# Patient Record
Sex: MALE | Race: BLACK OR AFRICAN AMERICAN | NOT HISPANIC OR LATINO | Employment: STUDENT | URBAN - METROPOLITAN AREA
[De-identification: names, ages, dates, MRNs, and addresses within clinical notes are randomized per-mention and may not be internally consistent; named-entity substitution may affect disease eponyms.]

---

## 2017-03-20 ENCOUNTER — OFFICE VISIT (OUTPATIENT)
Dept: OPTOMETRY | Facility: CLINIC | Age: 22
End: 2017-03-20
Payer: COMMERCIAL

## 2017-03-20 DIAGNOSIS — H52.03 HYPEROPIA, BILATERAL: ICD-10-CM

## 2017-03-20 DIAGNOSIS — H53.9 VISION DISTURBANCE: Primary | ICD-10-CM

## 2017-03-20 PROCEDURE — 92014 COMPRE OPH EXAM EST PT 1/>: CPT | Mod: S$GLB,,, | Performed by: OPTOMETRIST

## 2017-03-20 PROCEDURE — 99999 PR PBB SHADOW E&M-EST. PATIENT-LVL II: CPT | Mod: PBBFAC,,, | Performed by: OPTOMETRIST

## 2017-03-20 PROCEDURE — 92015 DETERMINE REFRACTIVE STATE: CPT | Mod: S$GLB,,, | Performed by: OPTOMETRIST

## 2017-03-20 NOTE — PROGRESS NOTES
HPI     Last eye exam was 4/18/16 with Dr. Muir.  Patient lost glasses and needs an updated rx. Has noticed vision is worse   since not wearing glasses.  Patient denies diplopia, headaches, flashes/floaters, and pain.         Last edited by Sirena Baldwin on 3/20/2017  1:13 PM.     ROS     Positive for: Eyes    Negative for: Constitutional, Gastrointestinal, Neurological, Skin,   Genitourinary, Musculoskeletal, HENT, Endocrine, Cardiovascular,   Respiratory, Psychiatric, Allergic/Imm, Heme/Lymph    Last edited by Marisa Muir, OD on 3/20/2017  3:55 PM. (History)        Assessment /Plan     For exam results, see Encounter Report.    Vision disturbance    Hyperopia, bilateral            1-2.  Glasses rx given.  Eye health normal.   RTC 2 years for routine exam.

## 2017-03-21 ENCOUNTER — HOSPITAL ENCOUNTER (OUTPATIENT)
Dept: RADIOLOGY | Facility: HOSPITAL | Age: 22
Discharge: HOME | End: 2017-03-21
Attending: NURSE PRACTITIONER
Payer: COMMERCIAL

## 2017-03-21 ENCOUNTER — OFFICE VISIT (OUTPATIENT)
Dept: INTERNAL MEDICINE | Facility: CLINIC | Age: 22
End: 2017-03-21
Payer: COMMERCIAL

## 2017-03-21 VITALS
SYSTOLIC BLOOD PRESSURE: 101 MMHG | HEART RATE: 50 BPM | DIASTOLIC BLOOD PRESSURE: 60 MMHG | HEIGHT: 60 IN | TEMPERATURE: 98 F | WEIGHT: 119.69 LBS | OXYGEN SATURATION: 95 % | BODY MASS INDEX: 23.5 KG/M2

## 2017-03-21 DIAGNOSIS — R06.2 WHEEZING: ICD-10-CM

## 2017-03-21 DIAGNOSIS — R05.9 COUGH: ICD-10-CM

## 2017-03-21 DIAGNOSIS — K29.70 GASTRITIS, PRESENCE OF BLEEDING UNSPECIFIED, UNSPECIFIED CHRONICITY, UNSPECIFIED GASTRITIS TYPE: ICD-10-CM

## 2017-03-21 DIAGNOSIS — J18.9 CAP (COMMUNITY ACQUIRED PNEUMONIA): Primary | ICD-10-CM

## 2017-03-21 DIAGNOSIS — E23.0 PANHYPOPITUITARISM: ICD-10-CM

## 2017-03-21 PROCEDURE — 96372 THER/PROPH/DIAG INJ SC/IM: CPT | Mod: S$GLB,,, | Performed by: NURSE PRACTITIONER

## 2017-03-21 PROCEDURE — 71020 XR CHEST PA AND LATERAL: CPT | Mod: TC

## 2017-03-21 PROCEDURE — 94640 AIRWAY INHALATION TREATMENT: CPT | Mod: 51,S$GLB,, | Performed by: NURSE PRACTITIONER

## 2017-03-21 PROCEDURE — 71020 XR CHEST PA AND LATERAL: CPT | Mod: 26,,, | Performed by: RADIOLOGY

## 2017-03-21 PROCEDURE — 99999 PR PBB SHADOW E&M-EST. PATIENT-LVL V: CPT | Mod: PBBFAC,,, | Performed by: NURSE PRACTITIONER

## 2017-03-21 PROCEDURE — 99213 OFFICE O/P EST LOW 20 MIN: CPT | Mod: 25,S$GLB,, | Performed by: NURSE PRACTITIONER

## 2017-03-21 RX ORDER — ONDANSETRON 4 MG/1
4 TABLET, ORALLY DISINTEGRATING ORAL EVERY 8 HOURS PRN
Qty: 30 TABLET | Refills: 0 | Status: SHIPPED | OUTPATIENT
Start: 2017-03-21 | End: 2018-07-02

## 2017-03-21 RX ORDER — ONDANSETRON 4 MG/1
4 TABLET, FILM COATED ORAL
Status: COMPLETED | OUTPATIENT
Start: 2017-03-21 | End: 2017-03-21

## 2017-03-21 RX ORDER — ALBUTEROL SULFATE 0.83 MG/ML
2.5 SOLUTION RESPIRATORY (INHALATION)
Status: COMPLETED | OUTPATIENT
Start: 2017-03-21 | End: 2017-03-21

## 2017-03-21 RX ORDER — BROMPHENIRAMINE MALEATE, PSEUDOEPHEDRINE HYDROCHLORIDE, AND DEXTROMETHORPHAN HYDROBROMIDE 2; 30; 10 MG/5ML; MG/5ML; MG/5ML
5 SYRUP ORAL EVERY 6 HOURS PRN
Refills: 0 | COMMUNITY
Start: 2017-03-16 | End: 2017-11-22

## 2017-03-21 RX ORDER — CEFTRIAXONE 500 MG/1
500 INJECTION, POWDER, FOR SOLUTION INTRAMUSCULAR; INTRAVENOUS
Status: COMPLETED | OUTPATIENT
Start: 2017-03-21 | End: 2017-03-21

## 2017-03-21 RX ORDER — LEVOFLOXACIN 750 MG/1
750 TABLET ORAL DAILY
Qty: 10 TABLET | Refills: 0 | Status: SHIPPED | OUTPATIENT
Start: 2017-03-21 | End: 2018-07-02

## 2017-03-21 RX ORDER — AMOXICILLIN 500 MG/1
500 CAPSULE ORAL 3 TIMES DAILY
Refills: 0 | COMMUNITY
Start: 2017-03-16 | End: 2017-03-21

## 2017-03-21 RX ADMIN — ONDANSETRON 4 MG: 4 TABLET, FILM COATED ORAL at 04:03

## 2017-03-21 RX ADMIN — CEFTRIAXONE 500 MG: 500 INJECTION, POWDER, FOR SOLUTION INTRAMUSCULAR; INTRAVENOUS at 05:03

## 2017-03-21 RX ADMIN — ALBUTEROL SULFATE 2.5 MG: 0.83 SOLUTION RESPIRATORY (INHALATION) at 03:03

## 2017-03-21 NOTE — MR AVS SNAPSHOT
[facility]  [address]  [facility] [address]  Phone: [phone]  Fax: [phone]                  Milton Berkowitz   3/21/2017 3:30 PM   Office Visit    Description:  Male : 1995   Provider:  Nayla Stoddard NP   Department:  [facility]           Reason for Visit     Nausea           Diagnoses this Visit        Comments    CAP (community acquired pneumonia)    -  Primary     Gastritis, presence of bleeding unspecified, unspecified chronicity, unspecified gastritis type         Wheezing         Panhypopituitarism                To Do List           Future Appointments        Provider Department Dept Phone    2017 8:00 AM MD [facility] - Endo/Diab/Metab [phone]      Goals (5 Years of Data)     None       These Medications        Disp Refills Start End    ondansetron (ZOFRAN-ODT) 4 MG TbDL 30 tablet 0 3/21/2017     Take 1 tablet (4 mg total) by mouth every 8 (eight) hours as needed. - Oral    [id number] [address] Ph #: [phone]       levoFLOXacin (LEVAQUIN) 750 MG tablet 10 tablet 0 3/21/2017     Take 1 tablet (750 mg total) by mouth once daily. - Oral    Pharmacy: [id number] - [place] - [address] Ph #: [phone]         [facility]     [facility] On Call Nurse Care Line -  Assistance  Registered nurses in the [facility] provide clinical advisement, health education, appointment booking, and other advisory services.  Call for this free service at [phone].             Medications           Message regarding Medications     Verify the changes and/or additions to your medication regime listed below are the same as discussed with your clinician today.  If any of these changes or additions are incorrect, please notify your healthcare provider.        START taking these NEW medications        Refills    ondansetron (ZOFRAN-ODT)  4 MG TbDL 0    Sig: Take 1 tablet (4 mg total) by mouth every 8 (eight) hours as needed.    Class: Normal    Route: Oral    levoFLOXacin (LEVAQUIN) 750 MG tablet 0    Sig: Take 1 tablet (750 mg total) by mouth once daily.    Class: Normal    Route: Oral      These medications were administered today        Dose Freq    (pyxis) gi cocktail (mylanta 30 mL, lidocaine 2 % viscous 10 mL, dicyclomine 10 mL) 50 mL  Clinic/HOD 1 time    Sig: Take by mouth one time.    Class: Normal    Route: Oral    albuterol nebulizer solution 2.5 mg 2.5 mg Clinic/HOD 1 time    Sig: Take 3 mLs (2.5 mg total) by nebulization one time.    Class: Normal    Route: Nebulization    ondansetron tablet 4 mg 4 mg Clinic/HOD 1 time    Sig: Take 1 tablet (4 mg total) by mouth one time.    Class: Normal    Route: Oral    cefTRIAXone injection 500 mg 500 mg Clinic/HOD 1 time    Sig: Inject 0.5 g (500 mg total) into the muscle one time.    Class: Normal    Route: Intramuscular      STOP taking these medications     amoxicillin (AMOXIL) 500 MG capsule Take 500 mg by mouth 3 (three) times daily.           Verify that the below list of medications is an accurate representation of the medications you are currently taking.  If none reported, the list may be blank. If incorrect, please contact your healthcare provider. Carry this list with you in case of emergency.           Current Medications     albuterol 90 mcg/actuation inhaler Inhale 1-2 puffs into the lungs every 4 to 6 hours as needed for Wheezing or Shortness of Breath.    hydrocortisone (CORTEF) 5 MG Tab Take 5 mg by mouth 3 (three) times daily.    LEVOTHYROXINE SODIUM (SYNTHROID ORAL) Take by mouth.    brompheniramine-pseudoeph-DM 2-30-10 mg/5 mL Syrp Take 5 mLs by mouth every 6 (six) hours as needed.    inhalation device (AEROCHAMBER PLUS FLOW-VU) Use as directed for inhalation.    levoFLOXacin (LEVAQUIN) 750 MG tablet Take 1 tablet (750 mg total) by mouth once daily.    ondansetron (ZOFRAN-ODT) 4  "MG TbDL Take 1 tablet (4 mg total) by mouth every 8 (eight) hours as needed.           Clinical Reference Information           Your Vitals Were     BP Pulse Temp Height Weight SpO2    101/60 50 97.7 °F (36.5 °C) (Oral) 4' 11" (1.499 m) 54.3 kg (119 lb 11.4 oz) 95%    BMI                24.18 kg/m2          Blood Pressure          Most Recent Value    BP  101/60      Allergies as of 3/21/2017     No Known Allergies      Immunizations Administered on Date of Encounter - 3/21/2017     None      Orders Placed During Today's Visit      Normal Orders This Visit    Ambulatory Referral to Endocrinology     Future Labs/Procedures Expected by Expires    Amylase  3/21/2017 5/20/2018    CBC auto differential  3/21/2017 5/20/2018    Comprehensive metabolic panel  3/21/2017 3/21/2018    Lipase  3/21/2017 5/20/2018    TSH  3/21/2017 5/20/2018    X-Ray Chest PA And Lateral  3/21/2017 3/21/2018      Administrations This Visit     (pyxis) gi cocktail (mylanta 30 mL, lidocaine 2 % viscous 10 mL, dicyclomine 10 mL) 50 mL     Admin Date Action Dose Route Administered By             03/21/2017 Given   Oral Judy Blanc LPN                    albuterol nebulizer solution 2.5 mg     Admin Date Action Dose Route Administered By             03/21/2017 Given 2.5 mg Nebulization Judy Blanc LPN                    ondansetron tablet 4 mg     Admin Date Action Dose Route Administered By             03/21/2017 Given 4 mg Oral Judy Blanc LPN                      Language Assistance Services     ATTENTION: Language assistance services are available, free of charge. Please call [phone].      ATENCIÓN: Si habla español, tiene a sim disposición servicios gratuitos de asistencia lingüística. Llame al [phone].     CHÚ Ý: N?u b?n nói Ti?ng Vi?t, có các d?ch v? h? tr? ngôn ng? mi?n phí dành cho b?n. G?i s? [phone].         [facility] complies with applicable Federal civil rights laws and does not discriminate on the " basis of race, color, national origin, age, disability, or sex.

## 2017-03-21 NOTE — PROGRESS NOTES
Subjective:       Patient ID: Milton Berkowitz is a 22 y.o. male.    Chief Complaint: Nausea  Mr Berkowitz presents to the clinic today for ongoing nausea and stomach pain, as well as wheezing/worsening asthma symptoms.     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  He was seen at the school clinic, for suspected flu, about 1 week ago.  He is now home on Spring break.  His flu test was negative.  He was prescribed amoxicillin and cough medicine.  He has not taken the amoxil because when he took it, it made his stomach discomfort and nausea worse.    His medical hx includes panhypopituitarism, but he has been lost to follow up.  He has been treated at [facility] in the past, but his mother's insurance no longer covers them to go to [facility].    Nausea   This is a new problem. The current episode started 1 to 4 weeks ago. The problem occurs constantly. The problem has been gradually worsening. Associated symptoms include abdominal pain, anorexia, a change in bowel habit, congestion, coughing, fatigue, a fever and nausea. Pertinent negatives include no arthralgias, chest pain, chills, diaphoresis, headaches, joint swelling, myalgias, neck pain, numbness, rash, sore throat, swollen glands, urinary symptoms, vertigo, visual change, vomiting or weakness. The symptoms are aggravated by eating (taking amoxil). He has tried nothing for the symptoms.     Review of Systems   Constitutional: Positive for fatigue and fever. Negative for chills and diaphoresis.   HENT: Positive for congestion. Negative for sore throat.    Eyes: Negative for visual disturbance.   Respiratory: Positive for cough.    Cardiovascular: Negative for chest pain.   Gastrointestinal: Positive for abdominal pain, anorexia, change in bowel habit and nausea. Negative for vomiting.   Genitourinary: Negative for dysuria.   Musculoskeletal: Negative for arthralgias, joint swelling, myalgias and neck pain.   Skin: Negative for rash.    Neurological: Negative for vertigo, weakness, numbness and headaches.   Psychiatric/Behavioral: Negative for confusion.       Objective:      Physical Exam   Constitutional: He is oriented to person, place, and time. No distress.   Appears younger than stated age.    HENT:   Head: Normocephalic and atraumatic.   Left Ear: A middle ear effusion is present.   Nose: Mucosal edema and rhinorrhea present. No sinus tenderness. Right sinus exhibits no maxillary sinus tenderness and no frontal sinus tenderness. Left sinus exhibits no maxillary sinus tenderness and no frontal sinus tenderness.   Mouth/Throat: Posterior oropharyngeal erythema present. No oropharyngeal exudate, posterior oropharyngeal edema or tonsillar abscesses.   Eyes: No scleral icterus.   Neck: Normal range of motion. Neck supple.   Cardiovascular: Normal rate, regular rhythm and normal heart sounds.    Pulmonary/Chest: Effort normal. No respiratory distress. He has wheezes. He has no rales. He exhibits no tenderness.   Musculoskeletal: He exhibits no edema.   Lymphadenopathy:     He has no cervical adenopathy.   Neurological: He is alert and oriented to person, place, and time.   Skin: Skin is warm and dry. He is not diaphoretic.   Psychiatric: He has a normal mood and affect. His behavior is normal.   Nursing note and vitals reviewed.      Assessment:       1. CAP (community acquired pneumonia)    2. Gastritis, presence of bleeding unspecified, unspecified chronicity, unspecified gastritis type    3. Wheezing    4. Panhypopituitarism        Plan:   Milton was seen today for nausea.    Diagnoses and all orders for this visit:    CAP (community acquired pneumonia)  -     X-Ray Chest PA And Lateral; Future  -     cefTRIAXone injection 500 mg; Inject 0.5 g (500 mg total) into the muscle one time.  -     levoFLOXacin (LEVAQUIN) 750 MG tablet; Take 1 tablet (750 mg total) by mouth once daily.    Gastritis, presence of bleeding unspecified, unspecified  chronicity, unspecified gastritis type  -     (pyxis) gi cocktail (mylanta 30 mL, lidocaine 2 % viscous 10 mL, dicyclomine 10 mL) 50 mL; Take by mouth one time.  -     CBC auto differential; Future  -     Comprehensive metabolic panel; Future  -     Amylase; Future  -     Lipase; Future  -     ondansetron tablet 4 mg; Take 1 tablet (4 mg total) by mouth one time.    Wheezing  -     albuterol nebulizer solution 2.5 mg; Take 3 mLs (2.5 mg total) by nebulization one time.  -     X-Ray Chest PA And Lateral; Future    Panhypopituitarism  -     Ambulatory Referral to Endocrinology  -     TSH; Future    Other orders  -     ondansetron (ZOFRAN-ODT) 4 MG TbDL; Take 1 tablet (4 mg total) by mouth every 8 (eight) hours as needed.          Pt has been given instructions populated from [employer] database and has verbalized understanding of the after visit summary and information contained wherein.    Follow up with a primary care provider. May go to ER for acute shortness of breath, lightheadedness, fever, or any other emergent complaints or changes in condition.

## 2017-03-21 NOTE — PROGRESS NOTES
2 patient identifiers used (name & ).  Procedure tolerated well.  Instructed to remain for 15-20 minutes and report any adverse reaction

## 2017-07-13 ENCOUNTER — OFFICE VISIT (OUTPATIENT)
Dept: ENDOCRINOLOGY | Facility: CLINIC | Age: 22
End: 2017-07-13
Payer: COMMERCIAL

## 2017-07-13 VITALS
WEIGHT: 133.81 LBS | RESPIRATION RATE: 18 BRPM | DIASTOLIC BLOOD PRESSURE: 76 MMHG | HEIGHT: 60 IN | BODY MASS INDEX: 26.27 KG/M2 | HEART RATE: 98 BPM | SYSTOLIC BLOOD PRESSURE: 98 MMHG

## 2017-07-13 DIAGNOSIS — E34.30 SHORT STATURE DUE TO ENDOCRINE DISORDER: ICD-10-CM

## 2017-07-13 DIAGNOSIS — E23.0 PANHYPOPITUITARISM: Primary | ICD-10-CM

## 2017-07-13 DIAGNOSIS — E23.0 GHD (GROWTH HORMONE DEFICIENCY): ICD-10-CM

## 2017-07-13 PROCEDURE — 99204 OFFICE O/P NEW MOD 45 MIN: CPT | Mod: S$GLB,,, | Performed by: INTERNAL MEDICINE

## 2017-07-13 PROCEDURE — 99999 PR PBB SHADOW E&M-EST. PATIENT-LVL IV: CPT | Mod: PBBFAC,,, | Performed by: INTERNAL MEDICINE

## 2017-07-13 NOTE — PROGRESS NOTES
"Subjective:      Patient ID: Milton Berkowitz is a 22 y.o. male.    Chief Complaint:  Panhypopituitarism      History of Present Illness    Mr.Tyler TORREY Berkowitz is here with his mother as a new patient     He was diagnosed with panhypopituitarism at age 4 per mother   patient was following with [facility] since then   He is here to establish care       He was treated with HC and LT4 as well in past with GH without any improvement in height     Mom : 5'5"   Dad 5"6"    Has not taken GH in a while     As far they known he did not have MRI     Levothyroxine ? Dose 200-220 mcg     Was on HC 2.5 mg three times a day     Has not taken any pit hormones in last 2 years     Working out whole summer but not able to lose weight       He was having polyuria / nocturia was on DDAVP oral but not anymore   No more polyuria/poldypsia         Review of Systems   Constitutional: Positive for unexpected weight change.   Eyes: Negative for visual disturbance.   Respiratory: Negative for shortness of breath.    Cardiovascular: Negative for palpitations and leg swelling.   Gastrointestinal: Negative for constipation and diarrhea.   Endocrine: Negative for polydipsia and polyuria.   Musculoskeletal: Negative for gait problem.   Neurological: Negative for headaches.   Psychiatric/Behavioral: Positive for sleep disturbance (for past couple of weeks ).       Objective:   Physical Exam   Constitutional: He appears well-developed.   HENT:   Right Ear: External ear normal.   Left Ear: External ear normal.   Nose: Nose normal.   Neck: No tracheal deviation present. No thyromegaly present.   Cardiovascular: Normal rate.    No murmur heard.  Pulmonary/Chest: Effort normal and breath sounds normal.       Abdominal: Soft. He exhibits no mass.   Genitourinary:         Genitourinary Comments: Examination performed with      Musculoskeletal: He exhibits no edema.   Neurological: He is alert. No cranial nerve deficit or sensory deficit. " Coordination and gait normal.        Skin: No rash noted.   Psychiatric: He has a normal mood and affect. Judgment normal.   Vitals reviewed.      Lab Review:       Assessment:     1. Panhypopituitarism  ACTH    Cortisol, 8AM    Luteinizing hormone    Follicle stimulating hormone    Prolactin    Insulin-like growth factor    Growth hormone    TSH    T4, free    Testosterone Panel    Comprehensive metabolic panel    MRI Brain W WO Contrast    X-Ray Bone Age Study    CANCELED: X-Ray Wrist 2 View Left   2. GHD (growth hormone deficiency)     3. 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

## 2017-07-13 NOTE — LETTER
July 16, 2017      Nayla Stoddard, NP  [address]  [facility] [address]           [facility]Di[facility]Metab  [address]  [facility] [address]  Phone: [phone]  Fax: [phone]          Patient: Milton Berkowitz   MR Number: [id number]   YOB: 1995   Date of Visit: 7/13/2017       Dear Nayla Stoddard:    Thank you for referring Milton Berkowitz to me for evaluation. Attached you will find relevant portions of my assessment and plan of care.    If you have questions, please do not hesitate to call me. I look forward to following Milton Berkowitz along with you.    Sincerely,    Israel Kerr MD    Enclosure  CC:  No Recipients    If you would like to receive this communication electronically, please contact [email or web address] or [phone] to request more information on [employer] Link access.    For providers and/or their staff who would like to refer a patient to [facility], please contact us through our one-stop-shop provider referral line, [facility], at [phone].    If you feel you have received this communication in error or would no longer like to receive these types of communications, please e-mail [email or web address]

## 2017-07-14 ENCOUNTER — LAB VISIT (OUTPATIENT)
Dept: LAB | Facility: OTHER | Age: 22
End: 2017-07-14
Attending: INTERNAL MEDICINE
Payer: COMMERCIAL

## 2017-07-14 DIAGNOSIS — E23.0 PANHYPOPITUITARISM: ICD-10-CM

## 2017-07-14 LAB
ALBUMIN SERPL BCP-MCNC: 3.9 G/DL
ALP SERPL-CCNC: 125 U/L
ALT SERPL W/O P-5'-P-CCNC: 19 U/L
ANION GAP SERPL CALC-SCNC: 9 MMOL/L
AST SERPL-CCNC: 27 U/L
BILIRUB SERPL-MCNC: 0.4 MG/DL
BUN SERPL-MCNC: 13 MG/DL
CALCIUM SERPL-MCNC: 9.4 MG/DL
CHLORIDE SERPL-SCNC: 106 MMOL/L
CO2 SERPL-SCNC: 24 MMOL/L
CORTIS SERPL-MCNC: 6.5 UG/DL
CREAT SERPL-MCNC: 0.7 MG/DL
EST. GFR  (AFRICAN AMERICAN): >60 ML/MIN/1.73 M^2
EST. GFR  (NON AFRICAN AMERICAN): >60 ML/MIN/1.73 M^2
FSH SERPL-ACNC: 2.3 MIU/ML
GLUCOSE SERPL-MCNC: 78 MG/DL
LH SERPL-ACNC: 0.8 MIU/ML
POTASSIUM SERPL-SCNC: 4 MMOL/L
PROLACTIN SERPL IA-MCNC: 16.7 NG/ML
PROT SERPL-MCNC: 7.2 G/DL
SODIUM SERPL-SCNC: 139 MMOL/L
T4 FREE SERPL-MCNC: 0.64 NG/DL
TSH SERPL DL<=0.005 MIU/L-ACNC: 5.14 UIU/ML

## 2017-07-14 PROCEDURE — 84146 ASSAY OF PROLACTIN: CPT

## 2017-07-14 PROCEDURE — 83001 ASSAY OF GONADOTROPIN (FSH): CPT

## 2017-07-14 PROCEDURE — 80053 COMPREHEN METABOLIC PANEL: CPT

## 2017-07-14 PROCEDURE — 84439 ASSAY OF FREE THYROXINE: CPT

## 2017-07-14 PROCEDURE — 84443 ASSAY THYROID STIM HORMONE: CPT

## 2017-07-14 PROCEDURE — 83003 ASSAY GROWTH HORMONE (HGH): CPT

## 2017-07-14 PROCEDURE — 84270 ASSAY OF SEX HORMONE GLOBUL: CPT

## 2017-07-14 PROCEDURE — 82533 TOTAL CORTISOL: CPT

## 2017-07-14 PROCEDURE — 82024 ASSAY OF ACTH: CPT

## 2017-07-14 PROCEDURE — 83002 ASSAY OF GONADOTROPIN (LH): CPT

## 2017-07-14 PROCEDURE — 84305 ASSAY OF SOMATOMEDIN: CPT

## 2017-07-16 PROBLEM — E34.30 SHORT STATURE DUE TO ENDOCRINE DISORDER: Status: ACTIVE | Noted: 2017-07-16

## 2017-07-17 LAB
IGF-I SERPL-MCNC: 17 NG/ML (ref 91–442)
IGF-I Z-SCORE SERPL: <-3 SD

## 2017-07-18 ENCOUNTER — PATIENT MESSAGE (OUTPATIENT)
Dept: ENDOCRINOLOGY | Facility: CLINIC | Age: 22
End: 2017-07-18

## 2017-07-18 DIAGNOSIS — E23.0 PANHYPOPITUITARISM: ICD-10-CM

## 2017-07-18 DIAGNOSIS — E34.30 SHORT STATURE DUE TO ENDOCRINE DISORDER: ICD-10-CM

## 2017-07-18 DIAGNOSIS — E23.0 GHD (GROWTH HORMONE DEFICIENCY): Primary | ICD-10-CM

## 2017-07-18 LAB
ACTH PLAS-MCNC: 26 PG/ML
GH SERPL-MCNC: <0.1 NG/ML

## 2017-07-19 LAB
ALBUMIN SERPL-MCNC: 4.4 G/DL (ref 3.6–5.1)
SHBG SERPL-SCNC: 49 NMOL/L (ref 10–50)
TESTOST FREE SERPL-MCNC: 0.8 PG/ML (ref 46–224)
TESTOST SERPL-MCNC: 10 NG/DL (ref 250–1100)
TESTOSTERONE.FREE+WB SERPL-MCNC: 1.7 NG/DL (ref 110–575)

## 2017-07-24 ENCOUNTER — HOSPITAL ENCOUNTER (OUTPATIENT)
Dept: RADIOLOGY | Facility: HOSPITAL | Age: 22
Discharge: HOME | End: 2017-07-24
Attending: INTERNAL MEDICINE
Payer: COMMERCIAL

## 2017-07-24 DIAGNOSIS — E23.0 PANHYPOPITUITARISM: ICD-10-CM

## 2017-07-24 PROCEDURE — 25500020 PHARM REV CODE 255: Performed by: INTERNAL MEDICINE

## 2017-07-24 PROCEDURE — 77072 BONE AGE STUDIES: CPT | Mod: 26,,, | Performed by: RADIOLOGY

## 2017-07-24 PROCEDURE — A9585 GADOBUTROL INJECTION: HCPCS | Performed by: INTERNAL MEDICINE

## 2017-07-24 PROCEDURE — 70553 MRI BRAIN STEM W/O & W/DYE: CPT | Mod: TC

## 2017-07-24 PROCEDURE — 70553 MRI BRAIN STEM W/O & W/DYE: CPT | Mod: 26,,, | Performed by: RADIOLOGY

## 2017-07-24 PROCEDURE — 77072 BONE AGE STUDIES: CPT | Mod: TC

## 2017-07-24 RX ORDER — GADOBUTROL 604.72 MG/ML
3 INJECTION INTRAVENOUS
Status: COMPLETED | OUTPATIENT
Start: 2017-07-24 | End: 2017-07-24

## 2017-07-24 RX ADMIN — GADOBUTROL 3 ML: 604.72 INJECTION INTRAVENOUS at 05:07

## 2017-07-28 NOTE — TELEPHONE ENCOUNTER
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    # Sec. Hypothyroidism   Start hydrocortisone first after 2 days start levothyroxine 75 mcg daily     # GH deficiency   Start GH therapy 0.2 mg/kg/week is 1.71 mg per day

## 2017-07-30 ENCOUNTER — PATIENT MESSAGE (OUTPATIENT)
Dept: ENDOCRINOLOGY | Facility: CLINIC | Age: 22
End: 2017-07-30

## 2017-07-31 ENCOUNTER — TELEPHONE (OUTPATIENT)
Dept: ENDOCRINOLOGY | Facility: CLINIC | Age: 22
End: 2017-07-31

## 2017-07-31 ENCOUNTER — TELEPHONE (OUTPATIENT)
Dept: PHARMACY | Facility: CLINIC | Age: 22
End: 2017-07-31

## 2017-07-31 DIAGNOSIS — E23.0 GHD (GROWTH HORMONE DEFICIENCY): Primary | ICD-10-CM

## 2017-07-31 RX ORDER — HYDROCORTISONE 5 MG/1
TABLET ORAL
Qty: 100 TABLET | Refills: 11 | Status: SHIPPED | OUTPATIENT
Start: 2017-07-31 | End: 2018-03-23 | Stop reason: SDUPTHER

## 2017-07-31 RX ORDER — LEVOTHYROXINE SODIUM 75 UG/1
75 TABLET ORAL DAILY
Qty: 30 TABLET | Refills: 11 | Status: SHIPPED | OUTPATIENT
Start: 2017-07-31 | End: 2018-03-23 | Stop reason: SDUPTHER

## 2017-07-31 NOTE — TELEPHONE ENCOUNTER
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     # Sec. Hypothyroidism   Start hydrocortisone first after 2 days start levothyroxine 75 mcg daily      # GH deficiency   start GH with 1.5 mg daily

## 2017-07-31 NOTE — TELEPHONE ENCOUNTER
LVM- Hello this is [facility] specialty pharmacy, we have received an order for Nutropin from your provider and will contact you once a benefits investigation is complete with copay information, to set up pickup or shipment, and [facility] consultation.  feel free to give us a call with  any questions prior to hearing back from us at [phone]. thank you!_ABAD 7/31/17

## 2017-08-09 NOTE — TELEPHONE ENCOUNTER
PA for Nutropin AQ Nuspin DENIED because the formulary alternatives are Omnitrope and Norditropin.    Routing provider to advise.

## 2017-08-10 NOTE — TELEPHONE ENCOUNTER
Agree   We will check IGF-1 in 1 month after starting norditropin. So please let us know.     Concepción once  let us know. Book IGF-1 at 1 month from starting date

## 2017-08-11 ENCOUNTER — PATIENT MESSAGE (OUTPATIENT)
Dept: ENDOCRINOLOGY | Facility: CLINIC | Age: 22
End: 2017-08-11

## 2017-08-14 NOTE — TELEPHONE ENCOUNTER
FOR DOCUMENTATION ONLY:  Norditropin prior authorization approved x 1 year  8/10/17 through 8/9/18  PA# [id number]  $250.00 co pay.   Forwarded to financial assistance team for co pay card...CEB

## 2017-08-16 NOTE — TELEPHONE ENCOUNTER
DOCUMENTATION ONLY:     [employer]ordisure Copay Assistance Copay Card:    Norditropin Copay: $0.00   BIN:[id number]   ID:[id number]   GRP: [id number]   PCN: [id number]  Effective dates: 08/01/2017 to 08/01/2018   card will save patient up to $250/monthly fill

## 2017-08-17 NOTE — TELEPHONE ENCOUNTER
Called patient, he asked we contact his mother, Adrianna. Confirmed 2 patient identifiers - name and .    Norditropin Flexpro consultation and injection training offered to mom, Adrianna. She states that patient was on genotropin in the past and she is familiar with the drug; consultation declined. She will look at  website and call should any further questions arise. Patient asked to have medication shipped on 17 to arrive at patient's home on 17. $0 copay (004). Address confirmed - NO signature requirement requested. Pt currently is going to school in [place] nad mom aware that [facility] could not ship there currently. She asked this shipment be sent to her home and she will either drive it to him or have him come home to get it. She understands to call [facility] if it needs to be transferred to be filled at [facility] in the future, where it can be shipped to pt in [place].  All questions answered and addressed to patients satisfaction. [facility] will continue to reach out to patient monthly to arrange refills.

## 2017-08-21 ENCOUNTER — PATIENT MESSAGE (OUTPATIENT)
Dept: ENDOCRINOLOGY | Facility: CLINIC | Age: 22
End: 2017-08-21

## 2017-08-28 ENCOUNTER — PATIENT MESSAGE (OUTPATIENT)
Dept: ENDOCRINOLOGY | Facility: CLINIC | Age: 22
End: 2017-08-28

## 2017-09-03 ENCOUNTER — PATIENT MESSAGE (OUTPATIENT)
Dept: ENDOCRINOLOGY | Facility: CLINIC | Age: 22
End: 2017-09-03

## 2017-09-07 ENCOUNTER — PATIENT MESSAGE (OUTPATIENT)
Dept: ENDOCRINOLOGY | Facility: CLINIC | Age: 22
End: 2017-09-07

## 2017-09-08 ENCOUNTER — TELEPHONE (OUTPATIENT)
Dept: PHARMACY | Facility: CLINIC | Age: 22
End: 2017-09-08

## 2017-09-08 NOTE — TELEPHONE ENCOUNTER
Patient mother  called and after using two patient identifiers we scheduled a refill of the patients  Norditriopin to be shipped on 09/11/2017. Patient denies any new allergies , conditions or medications and does not have any questions for the pharmacist at this time. Patient has 14 number of doses on hand and has not missed any doses. Patients address was verified and a credit card is on file.

## 2017-09-20 ENCOUNTER — PATIENT MESSAGE (OUTPATIENT)
Dept: ENDOCRINOLOGY | Facility: CLINIC | Age: 22
End: 2017-09-20

## 2017-09-22 ENCOUNTER — PATIENT MESSAGE (OUTPATIENT)
Dept: ENDOCRINOLOGY | Facility: CLINIC | Age: 22
End: 2017-09-22

## 2017-10-02 ENCOUNTER — TELEPHONE (OUTPATIENT)
Dept: PHARMACY | Facility: CLINIC | Age: 22
End: 2017-10-02

## 2017-10-27 ENCOUNTER — PATIENT MESSAGE (OUTPATIENT)
Dept: ENDOCRINOLOGY | Facility: CLINIC | Age: 22
End: 2017-10-27

## 2017-10-27 DIAGNOSIS — E23.0 GHD (GROWTH HORMONE DEFICIENCY): Primary | ICD-10-CM

## 2017-10-28 ENCOUNTER — PATIENT MESSAGE (OUTPATIENT)
Dept: ENDOCRINOLOGY | Facility: CLINIC | Age: 22
End: 2017-10-28

## 2017-10-28 ENCOUNTER — LAB VISIT (OUTPATIENT)
Dept: LAB | Facility: HOSPITAL | Age: 22
End: 2017-10-28
Attending: INTERNAL MEDICINE
Payer: COMMERCIAL

## 2017-10-28 DIAGNOSIS — E23.0 GHD (GROWTH HORMONE DEFICIENCY): ICD-10-CM

## 2017-10-28 PROCEDURE — 84305 ASSAY OF SOMATOMEDIN: CPT

## 2017-10-28 PROCEDURE — 36415 COLL VENOUS BLD VENIPUNCTURE: CPT

## 2017-10-29 ENCOUNTER — PATIENT MESSAGE (OUTPATIENT)
Dept: ENDOCRINOLOGY | Facility: CLINIC | Age: 22
End: 2017-10-29

## 2017-10-31 ENCOUNTER — TELEPHONE (OUTPATIENT)
Dept: PHARMACY | Facility: CLINIC | Age: 22
End: 2017-10-31

## 2017-10-31 RX ORDER — SOMATROPIN 10 MG/1.5ML
INJECTION, SOLUTION SUBCUTANEOUS
Qty: 7.5 ML | Refills: 2 | Status: SHIPPED | OUTPATIENT
Start: 2017-10-31 | End: 2018-01-25 | Stop reason: SDUPTHER

## 2017-11-01 LAB
IGF-I SERPL-MCNC: 137 NG/ML (ref 91–442)
IGF-I Z-SCORE SERPL: -1.28 SD

## 2017-11-02 ENCOUNTER — PATIENT MESSAGE (OUTPATIENT)
Dept: ENDOCRINOLOGY | Facility: CLINIC | Age: 22
End: 2017-11-02

## 2017-11-02 DIAGNOSIS — E23.0 PANHYPOPITUITARISM: Primary | ICD-10-CM

## 2017-11-04 ENCOUNTER — PATIENT MESSAGE (OUTPATIENT)
Dept: ENDOCRINOLOGY | Facility: CLINIC | Age: 22
End: 2017-11-04

## 2017-11-22 ENCOUNTER — OFFICE VISIT (OUTPATIENT)
Dept: ENDOCRINOLOGY | Facility: CLINIC | Age: 22
End: 2017-11-22
Payer: COMMERCIAL

## 2017-11-22 ENCOUNTER — PATIENT MESSAGE (OUTPATIENT)
Dept: ENDOCRINOLOGY | Facility: CLINIC | Age: 22
End: 2017-11-22

## 2017-11-22 VITALS
WEIGHT: 130.5 LBS | RESPIRATION RATE: 16 BRPM | HEIGHT: 60 IN | BODY MASS INDEX: 25.62 KG/M2 | DIASTOLIC BLOOD PRESSURE: 68 MMHG | HEART RATE: 70 BPM | SYSTOLIC BLOOD PRESSURE: 102 MMHG

## 2017-11-22 DIAGNOSIS — E03.9 HYPOTHYROIDISM, UNSPECIFIED TYPE: ICD-10-CM

## 2017-11-22 DIAGNOSIS — E23.0 GHD (GROWTH HORMONE DEFICIENCY): ICD-10-CM

## 2017-11-22 DIAGNOSIS — E23.0 PANHYPOPITUITARISM: Primary | ICD-10-CM

## 2017-11-22 DIAGNOSIS — E34.30 SHORT STATURE DUE TO ENDOCRINE DISORDER: ICD-10-CM

## 2017-11-22 DIAGNOSIS — E23.0 HGHG (HYPOGONADOTROPIC HYPOGONADISM): ICD-10-CM

## 2017-11-22 PROCEDURE — 99999 PR PBB SHADOW E&M-EST. PATIENT-LVL III: CPT | Mod: PBBFAC,,, | Performed by: INTERNAL MEDICINE

## 2017-11-22 PROCEDURE — 99214 OFFICE O/P EST MOD 30 MIN: CPT | Mod: S$GLB,,, | Performed by: INTERNAL MEDICINE

## 2017-11-22 RX ORDER — TESTOSTERONE CYPIONATE 200 MG/ML
50 INJECTION, SOLUTION INTRAMUSCULAR
Qty: 9 ML | Refills: 0 | Status: SHIPPED | OUTPATIENT
Start: 2017-11-22 | End: 2018-03-23 | Stop reason: SDUPTHER

## 2017-11-22 NOTE — PROGRESS NOTES
"Subjective:      Patient ID: Milton Berkowitz is a 22 y.o. male.    Chief Complaint:  No chief complaint on file.      History of Present Illness    Mr.Tyler TORREY Berkowitz is here with his mother as a new patient     He was diagnosed with panhypopituitarism at age 4 per mother   patient was following with [facility] since then   He is here to establish care       He was treated with HC and LT4 as well in past with GH without any improvement in height     Mom : 5'5"   Dad 5"6"    Has not taken GH in a while before saw me     Has not taken any pit hormones in  2 years prior to visit with me     Working out whole summer but not able to lose weight       He was having polyuria / nocturia was on DDAVP oral but not anymore   No more polyuria/poldypsia     Interval HPI:  Doing well with norditropin 1.5 mg daily   Levothyroxine 75 mcg daily   HC 10 and 5 mg           Review of Systems   Constitutional: Positive for unexpected weight change.   Eyes: Negative for visual disturbance.   Respiratory: Negative for shortness of breath.    Cardiovascular: Negative for palpitations and leg swelling.   Gastrointestinal: Negative for constipation and diarrhea.   Endocrine: Negative for polydipsia and polyuria.   Musculoskeletal: Negative for gait problem.   Neurological: Negative for headaches.   Psychiatric/Behavioral: Negative for sleep disturbance.       Objective:   Physical Exam   Vitals reviewed.      Lab Review:       Assessment:     1. Panhypopituitarism  T4, free    Insulin-like growth factor    Testosterone    Follicle stimulating hormone    Luteinizing hormone    PSA, Screening    CBC auto differential    Comprehensive metabolic panel    T4, free    PSA, Screening    CBC auto differential    Comprehensive metabolic panel   2. GHD (growth hormone deficiency)  T4, free    Insulin-like growth factor    Testosterone    Follicle stimulating hormone    Luteinizing hormone    PSA, Screening    CBC auto differential    Comprehensive " metabolic panel    T4, free    PSA, Screening    CBC auto differential    Comprehensive metabolic panel   3. Short stature due to endocrine disorder  T4, free    Insulin-like growth factor    Testosterone    Follicle stimulating hormone    Luteinizing hormone    PSA, Screening    CBC auto differential    Comprehensive metabolic panel    T4, free    PSA, Screening    CBC auto differential    Comprehensive metabolic panel   4. Hypothyroidism, unspecified type  T4, free    Insulin-like growth factor    Testosterone    Follicle stimulating hormone    Luteinizing hormone    PSA, Screening    CBC auto differential    Comprehensive metabolic panel    T4, free    PSA, Screening    CBC auto differential    Comprehensive metabolic panel   5. 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     Panhypopituitarism-   Secondary adrenal insufficiency-   Continue  Hydrocortisone   Reviewed times of increased dose - sick day precautions handout provided and reviewed  Needs medical alert tag  Follow symptoms and labs      Secondary hypothyroidism - clinically and biochemically euthyroid  Follow ft4 and adjust accordingly       Hypogonadotrophic hypogonadism  Start testosterone 50 mg monthly   Check PSA, CBC, CMP today and in 1 month  No erections less likely making sperms, can not provide semen sample for analysis        Secondary AGHD - reviewed risks and benefits of therapy   Continue norditropin 1.5 mg daily   Follow igf-1 levels   At goal         Sodium normal no s/s of DI             Plan:

## 2017-12-01 ENCOUNTER — TELEPHONE (OUTPATIENT)
Dept: PHARMACY | Facility: CLINIC | Age: 22
End: 2017-12-01

## 2017-12-08 ENCOUNTER — PATIENT MESSAGE (OUTPATIENT)
Dept: ENDOCRINOLOGY | Facility: CLINIC | Age: 22
End: 2017-12-08

## 2017-12-23 ENCOUNTER — PATIENT MESSAGE (OUTPATIENT)
Dept: ENDOCRINOLOGY | Facility: CLINIC | Age: 22
End: 2017-12-23

## 2017-12-28 ENCOUNTER — TELEPHONE (OUTPATIENT)
Dept: PHARMACY | Facility: CLINIC | Age: 22
End: 2017-12-28

## 2017-12-28 ENCOUNTER — PATIENT MESSAGE (OUTPATIENT)
Dept: ENDOCRINOLOGY | Facility: CLINIC | Age: 22
End: 2017-12-28

## 2018-01-26 RX ORDER — SOMATROPIN 10 MG/1.5ML
INJECTION, SOLUTION SUBCUTANEOUS
Qty: 7.5 ML | Refills: 2 | Status: SHIPPED | OUTPATIENT
Start: 2018-01-26 | End: 2018-04-25 | Stop reason: SDUPTHER

## 2018-01-29 ENCOUNTER — TELEPHONE (OUTPATIENT)
Dept: PHARMACY | Facility: CLINIC | Age: 23
End: 2018-01-29

## 2018-02-05 ENCOUNTER — PATIENT MESSAGE (OUTPATIENT)
Dept: ENDOCRINOLOGY | Facility: CLINIC | Age: 23
End: 2018-02-05

## 2018-02-23 ENCOUNTER — PATIENT MESSAGE (OUTPATIENT)
Dept: ENDOCRINOLOGY | Facility: CLINIC | Age: 23
End: 2018-02-23

## 2018-02-23 ENCOUNTER — TELEPHONE (OUTPATIENT)
Dept: PHARMACY | Facility: CLINIC | Age: 23
End: 2018-02-23

## 2018-03-15 ENCOUNTER — PATIENT MESSAGE (OUTPATIENT)
Dept: ENDOCRINOLOGY | Facility: CLINIC | Age: 23
End: 2018-03-15

## 2018-03-23 ENCOUNTER — OFFICE VISIT (OUTPATIENT)
Dept: ENDOCRINOLOGY | Facility: CLINIC | Age: 23
End: 2018-03-23
Payer: COMMERCIAL

## 2018-03-23 VITALS
HEIGHT: 60 IN | SYSTOLIC BLOOD PRESSURE: 100 MMHG | WEIGHT: 131.38 LBS | HEART RATE: 70 BPM | BODY MASS INDEX: 25.79 KG/M2 | DIASTOLIC BLOOD PRESSURE: 82 MMHG

## 2018-03-23 DIAGNOSIS — E03.8 CENTRAL HYPOTHYROIDISM: ICD-10-CM

## 2018-03-23 DIAGNOSIS — E55.9 VITAMIN D DEFICIENCY: ICD-10-CM

## 2018-03-23 DIAGNOSIS — E23.0 PANHYPOPITUITARISM: Primary | ICD-10-CM

## 2018-03-23 DIAGNOSIS — E23.0 HGHG (HYPOGONADOTROPIC HYPOGONADISM): ICD-10-CM

## 2018-03-23 DIAGNOSIS — E23.0 GHD (GROWTH HORMONE DEFICIENCY): ICD-10-CM

## 2018-03-23 PROCEDURE — 99999 PR PBB SHADOW E&M-EST. PATIENT-LVL III: CPT | Mod: PBBFAC,,, | Performed by: INTERNAL MEDICINE

## 2018-03-23 PROCEDURE — 99214 OFFICE O/P EST MOD 30 MIN: CPT | Mod: S$GLB,,, | Performed by: INTERNAL MEDICINE

## 2018-03-23 RX ORDER — LEVOTHYROXINE SODIUM 75 UG/1
75 TABLET ORAL DAILY
Qty: 30 TABLET | Refills: 11 | Status: SHIPPED | OUTPATIENT
Start: 2018-03-23 | End: 2019-06-26

## 2018-03-23 RX ORDER — HYDROCORTISONE 5 MG/1
TABLET ORAL
Qty: 100 TABLET | Refills: 11 | Status: SHIPPED | OUTPATIENT
Start: 2018-03-23 | End: 2019-11-27 | Stop reason: SDUPTHER

## 2018-03-23 RX ORDER — TESTOSTERONE CYPIONATE 200 MG/ML
50 INJECTION, SOLUTION INTRAMUSCULAR
Qty: 9 ML | Refills: 0 | Status: SHIPPED | OUTPATIENT
Start: 2018-03-23 | End: 2018-07-02 | Stop reason: SDUPTHER

## 2018-03-23 NOTE — PROGRESS NOTES
"Subjective:      Patient ID: Miltno Berkowitz is a 23 y.o. male.    Chief Complaint:  Panhypopituitarism       History of Present Illness    Mr.Tyler TORREY Berkowitz returns to clinic today for follow up   Patient of Dr. Kerr   This is the patient's initial visit with me today     He was diagnosed with panhypopituitarism at age 4 per mother   patient was following with [facility] since then   Established care with [facility] in 11/2017       He was treated with HC and LT4 as well in past with GH without any improvement in height     Mom : 5'5"   Dad:  5"6"    Current regimen:   Hydrocortisone 5mg tablets - takes 10 mg every morning and 5 mg every evening   Levothyroxine 75 mcg   Norditropin 1.5 mg daily      Patient reports taking all medications as prescribed     He was having polyuria / nocturia was on DDAVP oral but not anymore   denies polyuria/poldypsia at this time     Patient was also prescribed Testosterone but states he is not taking due to insurance issues   States health insurance will not cover prescription     Review of Systems   Constitutional: Negative for unexpected weight change.   Eyes: Negative for visual disturbance.   Respiratory: Negative for shortness of breath.    Cardiovascular: Negative for palpitations and leg swelling.   Gastrointestinal: Negative for constipation and diarrhea.   Endocrine: Negative for polydipsia and polyuria.   Musculoskeletal: Negative for gait problem.   Neurological: Negative for headaches.   Psychiatric/Behavioral: Negative for sleep disturbance.     Objective:   Physical Exam   Constitutional: He is oriented to person, place, and time. No distress.   Neck: No thyromegaly present.   Cardiovascular: Normal rate and regular rhythm.    Pulmonary/Chest: Effort normal.   Abdominal: Soft.   Musculoskeletal: He exhibits no edema.   Neurological: He is alert and oriented to person, place, and time. He displays normal reflexes.   Skin: Skin is warm and dry.   Psychiatric: He has a " normal mood and affect. His behavior is normal.   Nursing note and vitals reviewed.    Lab Review:   Results for GEREMIAS GAMBOA (MRN [id number]) as of 3/23/2018 12:12   Ref. Range 7/14/2017 08:00 11/22/2017 12:15   TSH Latest Ref Range: 0.400 - 4.000 uIU/mL 5.139 (H)    Free T4 Latest Ref Range: 0.71 - 1.51 ng/dL 0.64 (L) 0.97     Results for GEREMIAS GAMBOA (MRN [id number]) as of 3/23/2018 12:12   Ref. Range 7/14/2017 08:00   ACTH Latest Ref Range: 0 - 46 pg/mL 26   Growth Hormone Latest Ref Range: 0.00 - 3.00 ng/mL <0.1     Results for GEREMIAS GAMBOA (MRN [id number]) as of 3/23/2018 12:12   Ref. Range 7/14/2017 08:00   Cortisol -8 AM Latest Ref Range: 4.30 - 22.40 ug/dL 6.5     Results for GEREMIAS GAMBOA (MRN [id number]) as of 3/23/2018 12:12   Ref. Range 4/18/2016 10:55   Vit D, 25-Hydroxy Latest Ref Range: 30 - 96 ng/mL 10 (L)     Results for GEREMIAS GAMBOA (MRN [id number]) as of 3/23/2018 12:12   Ref. Range 11/22/2017 12:15   Sodium Latest Ref Range: 136 - 145 mmol/L 139   Potassium Latest Ref Range: 3.5 - 5.1 mmol/L 4.6   Chloride Latest Ref Range: 95 - 110 mmol/L 105   CO2 Latest Ref Range: 23 - 29 mmol/L 26   Anion Gap Latest Ref Range: 8 - 16 mmol/L 8   BUN, Bld Latest Ref Range: 6 - 20 mg/dL 14   Creatinine Latest Ref Range: 0.5 - 1.4 mg/dL 0.7   eGFR if non African American Latest Ref Range: >60 mL/min/1.73 m^2 >60.0   eGFR if African American Latest Ref Range: >60 mL/min/1.73 m^2 >60.0   Glucose Latest Ref Range: 70 - 110 mg/dL 83   Calcium Latest Ref Range: 8.7 - 10.5 mg/dL 9.8   Alkaline Phosphatase Latest Ref Range: 55 - 135 U/L 249 (H)   Total Protein Latest Ref Range: 6.0 - 8.4 g/dL 7.6   Albumin Latest Ref Range: 3.5 - 5.2 g/dL 3.9   Total Bilirubin Latest Ref Range: 0.1 - 1.0 mg/dL 0.7   AST Latest Ref Range: 10 - 40 U/L 25   ALT Latest Ref Range: 10 - 44 U/L 12     Results for GEREMIAS GAMBOA (MRN [id number]) as of 3/23/2018 12:12   Ref. Range 7/14/2017 08:00   FSH Latest Ref Range: 0.95 - 11.95 mIU/mL 2.30   LH  Latest Ref Range: 0.6 - 12.1 mIU/mL 0.8   Prolactin Latest Ref Range: 3.5 - 19.4 ng/mL 16.7   Testosterone Latest Ref Range: 250 - 1100 ng/dL 10 (L)   Testosterone, Free Latest Ref Range: 46.0 - 224.0 pg/mL 0.8 (L)   Testosterone Testosterone Latest Ref Range: 110.0 - 575.0 ng/dL 1.7 (L)   Sex Hormone Binding Globulin Latest Ref Range: 10 - 50 nmol/L 49   Albumin Latest Ref Range: 3.6 - 5.1 g/dL 4.4     Results for GEREMIAS GAMBOA (MRN [id number]) as of 3/23/2018 12:12   Ref. Range 4/18/2016 10:55   DHEA-SO4 Latest Ref Range: 45.1 - 385.0 ug/dL 105.9     Results for GEREMIAS GAMBOA (MRN [id number]) as of 3/23/2018 14:47   Ref. Range 10/28/2017 11:21   Somatomedin (IGF-I) Latest Ref Range: 91 - 442 ng/mL 137     Results for GEREMIAS GAMBOA (MRN [id number]) as of 3/23/2018 14:47   Ref. Range 11/22/2017 12:15   PSA, SCREEN Latest Ref Range: 0.00 - 4.00 ng/mL <0.01     Results for GEREMIAS GAMBOA (MRN [id number]) as of 3/23/2018 14:47   Ref. Range 11/22/2017 12:15   WBC Latest Ref Range: 3.90 - 12.70 K/uL 6.51   RBC Latest Ref Range: 4.60 - 6.20 M/uL 3.97 (L)   Hemoglobin Latest Ref Range: 14.0 - 18.0 g/dL 11.5 (L)   Hematocrit Latest Ref Range: 40.0 - 54.0 % 34.4 (L)   MCV Latest Ref Range: 82 - 98 fL 87   MCH Latest Ref Range: 27.0 - 31.0 pg 29.0   MCHC Latest Ref Range: 32.0 - 36.0 g/dL 33.4   RDW Latest Ref Range: 11.5 - 14.5 % 12.5   Platelets Latest Ref Range: 150 - 350 K/uL 298   MPV Latest Ref Range: 9.2 - 12.9 fL 10.4   Gran% Latest Ref Range: 38.0 - 73.0 % 63.4   Gran # (ANC) Latest Ref Range: 1.8 - 7.7 K/uL 4.1   Immature Granulocytes Latest Ref Range: 0.0 - 0.5 % 0.5   Immature Grans (Abs) Latest Ref Range: 0.00 - 0.04 K/uL 0.03   Lymph% Latest Ref Range: 18.0 - 48.0 % 28.0   Lymph # Latest Ref Range: 1.0 - 4.8 K/uL 1.8   Mono% Latest Ref Range: 4.0 - 15.0 % 6.1   Mono # Latest Ref Range: 0.3 - 1.0 K/uL 0.4   Eosinophil% Latest Ref Range: 0.0 - 8.0 % 1.5   Eos # Latest Ref Range: 0.0 - 0.5 K/uL 0.1   Basophil% Latest Ref  Range: 0.0 - 1.9 % 0.5   Baso # Latest Ref Range: 0.00 - 0.20 K/uL 0.03     Assessment:     1. Panhypopituitarism  hydrocortisone (CORTEF) 5 MG Tab    Testosterone    Comprehensive metabolic panel    Insulin-like growth factor   2. Central hypothyroidism  levothyroxine (SYNTHROID) 75 MCG tablet    T4, free   3. Vitamin D deficiency  Vitamin D   4. HGHG (hypogonadotropic hypogonadism)  testosterone cypionate (DEPOTESTOTERONE CYPIONATE) 200 mg/mL injection   5. GHD (growth hormone deficiency)          ASSESSMENT AND PLAN:     Very interesting case Mr.Tyler TORREY Berkowitz is a college  Student     1. Panhypopituitarism-   Secondary adrenal insufficiency-   Continue  Hydrocortisone   Reviewed times of increased dose - sick day precautions handout provided and reviewed  Needs medical alert tag  Follow symptoms and labs      2. Secondary hypothyroidism - clinically and biochemically euthyroid  Follow ft4 and adjust accordingly  Continue Levothyroxine 75 mcg once daily     3. Vitamin d deficiency   Noted to be low in 2016  Check vitamin d level today and will treat accordingly     4. Hypogonadotrophic hypogonadism  Start testosterone 50 mg monthly   Check CBC in 1 month   Will arrange BMD at next visit - pt attends college out of state and is leaving on Sunday to return to school   No erections less likely making sperms, can not provide semen sample for analysis     5. Secondary AGHD - reviewed risks and benefits of therapy   Continue norditropin 1.5 mg daily   Follow igf-1 levels   At goal         Sodium normal no s/s of DI       Patient personally interviewed Dr. Kerr   Recommendations were discussed with the patient in detail   Advised to notify office if he has any additional concerns or questions regarding his care   The patient voiced understanding

## 2018-03-24 ENCOUNTER — LAB VISIT (OUTPATIENT)
Dept: LAB | Facility: HOSPITAL | Age: 23
End: 2018-03-24
Attending: INTERNAL MEDICINE
Payer: COMMERCIAL

## 2018-03-24 DIAGNOSIS — E55.9 VITAMIN D DEFICIENCY: ICD-10-CM

## 2018-03-24 DIAGNOSIS — E03.8 CENTRAL HYPOTHYROIDISM: ICD-10-CM

## 2018-03-24 DIAGNOSIS — E23.0 PANHYPOPITUITARISM: ICD-10-CM

## 2018-03-24 LAB
25(OH)D3+25(OH)D2 SERPL-MCNC: 5 NG/ML
ALBUMIN SERPL BCP-MCNC: 4.3 G/DL
ALP SERPL-CCNC: 172 U/L
ALT SERPL W/O P-5'-P-CCNC: 12 U/L
ANION GAP SERPL CALC-SCNC: 11 MMOL/L
AST SERPL-CCNC: 22 U/L
BILIRUB SERPL-MCNC: 0.7 MG/DL
BUN SERPL-MCNC: 10 MG/DL
CALCIUM SERPL-MCNC: 10.2 MG/DL
CHLORIDE SERPL-SCNC: 106 MMOL/L
CO2 SERPL-SCNC: 25 MMOL/L
CREAT SERPL-MCNC: 0.7 MG/DL
EST. GFR  (AFRICAN AMERICAN): >60 ML/MIN/1.73 M^2
EST. GFR  (NON AFRICAN AMERICAN): >60 ML/MIN/1.73 M^2
GLUCOSE SERPL-MCNC: 98 MG/DL
POTASSIUM SERPL-SCNC: 4.4 MMOL/L
PROT SERPL-MCNC: 7.5 G/DL
SODIUM SERPL-SCNC: 142 MMOL/L
T4 FREE SERPL-MCNC: 0.81 NG/DL
TESTOST SERPL-MCNC: 10 NG/DL

## 2018-03-24 PROCEDURE — 84439 ASSAY OF FREE THYROXINE: CPT

## 2018-03-24 PROCEDURE — 84403 ASSAY OF TOTAL TESTOSTERONE: CPT

## 2018-03-24 PROCEDURE — 82306 VITAMIN D 25 HYDROXY: CPT

## 2018-03-24 PROCEDURE — 84305 ASSAY OF SOMATOMEDIN: CPT

## 2018-03-24 PROCEDURE — 80053 COMPREHEN METABOLIC PANEL: CPT

## 2018-03-26 ENCOUNTER — TELEPHONE (OUTPATIENT)
Dept: ENDOCRINOLOGY | Facility: CLINIC | Age: 23
End: 2018-03-26

## 2018-03-26 ENCOUNTER — TELEPHONE (OUTPATIENT)
Dept: PHARMACY | Facility: CLINIC | Age: 23
End: 2018-03-26

## 2018-03-26 DIAGNOSIS — E55.9 VITAMIN D DEFICIENCY DISEASE: ICD-10-CM

## 2018-03-26 RX ORDER — ERGOCALCIFEROL 1.25 MG/1
CAPSULE ORAL
Qty: 12 CAPSULE | Refills: 2 | Status: SHIPPED | OUTPATIENT
Start: 2018-03-26 | End: 2018-07-07 | Stop reason: SDUPTHER

## 2018-03-29 LAB
IGF-I SERPL-MCNC: 112 NG/ML (ref 91–442)
IGF-I Z-SCORE SERPL: -1.65 SD

## 2018-04-23 RX ORDER — SOMATROPIN 10 MG/1.5ML
INJECTION, SOLUTION SUBCUTANEOUS
Qty: 7.5 ML | Refills: 2 | Status: CANCELLED | OUTPATIENT
Start: 2018-04-23

## 2018-04-26 ENCOUNTER — TELEPHONE (OUTPATIENT)
Dept: PHARMACY | Facility: CLINIC | Age: 23
End: 2018-04-26

## 2018-05-21 ENCOUNTER — TELEPHONE (OUTPATIENT)
Dept: PHARMACY | Facility: CLINIC | Age: 23
End: 2018-05-21

## 2018-06-07 ENCOUNTER — PATIENT MESSAGE (OUTPATIENT)
Dept: ENDOCRINOLOGY | Facility: CLINIC | Age: 23
End: 2018-06-07

## 2018-06-08 ENCOUNTER — PATIENT MESSAGE (OUTPATIENT)
Dept: ENDOCRINOLOGY | Facility: CLINIC | Age: 23
End: 2018-06-08

## 2018-06-22 ENCOUNTER — TELEPHONE (OUTPATIENT)
Dept: PHARMACY | Facility: CLINIC | Age: 23
End: 2018-06-22

## 2018-07-02 ENCOUNTER — OFFICE VISIT (OUTPATIENT)
Dept: ENDOCRINOLOGY | Facility: CLINIC | Age: 23
End: 2018-07-02
Payer: COMMERCIAL

## 2018-07-02 VITALS
HEIGHT: 60 IN | WEIGHT: 140.19 LBS | HEART RATE: 77 BPM | DIASTOLIC BLOOD PRESSURE: 70 MMHG | BODY MASS INDEX: 27.52 KG/M2 | SYSTOLIC BLOOD PRESSURE: 110 MMHG

## 2018-07-02 DIAGNOSIS — E23.0 PANHYPOPITUITARISM: Primary | ICD-10-CM

## 2018-07-02 DIAGNOSIS — E03.8 CENTRAL HYPOTHYROIDISM: ICD-10-CM

## 2018-07-02 DIAGNOSIS — E55.9 VITAMIN D DEFICIENCY DISEASE: ICD-10-CM

## 2018-07-02 DIAGNOSIS — E23.0 GHD (GROWTH HORMONE DEFICIENCY): ICD-10-CM

## 2018-07-02 DIAGNOSIS — E23.0 HGHG (HYPOGONADOTROPIC HYPOGONADISM): ICD-10-CM

## 2018-07-02 PROCEDURE — 99999 PR PBB SHADOW E&M-EST. PATIENT-LVL III: CPT | Mod: PBBFAC,,, | Performed by: INTERNAL MEDICINE

## 2018-07-02 PROCEDURE — 3008F BODY MASS INDEX DOCD: CPT | Mod: CPTII,S$GLB,, | Performed by: INTERNAL MEDICINE

## 2018-07-02 PROCEDURE — 99214 OFFICE O/P EST MOD 30 MIN: CPT | Mod: S$GLB,,, | Performed by: INTERNAL MEDICINE

## 2018-07-02 RX ORDER — TESTOSTERONE CYPIONATE 200 MG/ML
50 INJECTION, SOLUTION INTRAMUSCULAR
Qty: 10 ML | Refills: 0 | Status: SHIPPED | OUTPATIENT
Start: 2018-07-02 | End: 2018-07-05 | Stop reason: SDUPTHER

## 2018-07-02 NOTE — PROGRESS NOTES
"Subjective:      Patient ID: Milton Berkowitz is a 23 y.o. male.    Chief Complaint:  Panhypopituitarism       History of Present Illness    Mr.Tyler TORREY Berkowitz returns to clinic today for follow up   Patient of Dr. Kerr   This is the patient's second visit with me today     He was diagnosed with panhypopituitarism at age 4 per mother   patient was following with [facility] since then   Established care with [facility] in 11/2017       He was treated with HC and LT4 as well in past with GH without any improvement in height     Mom : 5'5"   Dad:  5"6"    Current regimen:   Hydrocortisone 5mg tablets - takes 10 mg every morning and 5 mg every evening   Levothyroxine 75 mcg   Norditropin 1.5 mg daily   Testosterone 50 mg IM every 28 days      Patient reports taking all medications as prescribed     He was having polyuria / nocturia was on DDAVP oral but not anymore   Denies polyuria/poldypsia at this time     He denies recent falls or fractures     Review of Systems   Constitutional: Negative for unexpected weight change.   Eyes: Negative for visual disturbance.   Respiratory: Negative for shortness of breath.    Cardiovascular: Negative for palpitations and leg swelling.   Gastrointestinal: Negative for constipation and diarrhea.   Endocrine: Negative for polydipsia and polyuria.   Musculoskeletal: Negative for gait problem.   Neurological: Negative for headaches.   Psychiatric/Behavioral: Negative for sleep disturbance.     Objective:   Physical Exam   Constitutional: He is oriented to person, place, and time. No distress.   Neck: No thyromegaly present.   Cardiovascular: Normal rate and regular rhythm.    Pulmonary/Chest: Effort normal.   Abdominal: Soft.   Musculoskeletal: He exhibits no edema.   Neurological: He is alert and oriented to person, place, and time. He displays normal reflexes.   Skin: Skin is warm and dry.   Psychiatric: He has a normal mood and affect. His behavior is normal.   Nursing note and vitals " reviewed.    Lab Review:   Results for GEREMIAS GAMBOA (MRN [id number]) as of 7/2/2018 12:49   Ref. Range 7/14/2017 08:00 11/22/2017 12:15 3/24/2018 08:03   TSH Latest Ref Range: 0.400 - 4.000 uIU/mL 5.139 (H)     Free T4 Latest Ref Range: 0.71 - 1.51 ng/dL 0.64 (L) 0.97 0.81     Results for GEREMIAS GAMBOA (MRN [id number]) as of 3/23/2018 12:12   Ref. Range 7/14/2017 08:00   ACTH Latest Ref Range: 0 - 46 pg/mL 26   Growth Hormone Latest Ref Range: 0.00 - 3.00 ng/mL <0.1     Results for GEREMIAS GAMBOA (MRN [id number]) as of 3/23/2018 12:12   Ref. Range 7/14/2017 08:00   Cortisol -8 AM Latest Ref Range: 4.30 - 22.40 ug/dL 6.5     Results for GEREMIAS GAMBOA (MRN [id number]) as of 7/2/2018 12:49   Ref. Range 3/24/2018 08:03   Vit D, 25-Hydroxy Latest Ref Range: 30 - 96 ng/mL 5 (L)     Results for GEREMIAS GAMBOA (MRN [id number]) as of 7/2/2018 12:49   Ref. Range 3/24/2018 08:03   Sodium Latest Ref Range: 136 - 145 mmol/L 142   Potassium Latest Ref Range: 3.5 - 5.1 mmol/L 4.4   Chloride Latest Ref Range: 95 - 110 mmol/L 106   CO2 Latest Ref Range: 23 - 29 mmol/L 25   Anion Gap Latest Ref Range: 8 - 16 mmol/L 11   BUN, Bld Latest Ref Range: 6 - 20 mg/dL 10   Creatinine Latest Ref Range: 0.5 - 1.4 mg/dL 0.7   eGFR if non African American Latest Ref Range: >60 mL/min/1.73 m^2 >60   eGFR if  Latest Ref Range: >60 mL/min/1.73 m^2 >60   Glucose Latest Ref Range: 70 - 110 mg/dL 98   Calcium Latest Ref Range: 8.7 - 10.5 mg/dL 10.2   Alkaline Phosphatase Latest Ref Range: 55 - 135 U/L 172 (H)   Total Protein Latest Ref Range: 6.0 - 8.4 g/dL 7.5   Albumin Latest Ref Range: 3.5 - 5.2 g/dL 4.3   Total Bilirubin Latest Ref Range: 0.1 - 1.0 mg/dL 0.7   AST Latest Ref Range: 10 - 40 U/L 22   ALT Latest Ref Range: 10 - 44 U/L 12     Results for GEREMIAS GAMBOA (MRN [id number]) as of 7/2/2018 12:49   Ref. Range 3/24/2018 08:03   Testosterone, Total Latest Ref Range: 195.0 - 1138.0 ng/dL 10 (L)     Results for GEREMIAS GAMBOA (MRN [id number]) as  of 3/23/2018 12:12   Ref. Range 7/14/2017 08:00   FSH Latest Ref Range: 0.95 - 11.95 mIU/mL 2.30   LH Latest Ref Range: 0.6 - 12.1 mIU/mL 0.8   Prolactin Latest Ref Range: 3.5 - 19.4 ng/mL 16.7   Testosterone Latest Ref Range: 250 - 1100 ng/dL 10 (L)   Testosterone, Free Latest Ref Range: 46.0 - 224.0 pg/mL 0.8 (L)   Testosterone Testosterone Latest Ref Range: 110.0 - 575.0 ng/dL 1.7 (L)   Sex Hormone Binding Globulin Latest Ref Range: 10 - 50 nmol/L 49   Albumin Latest Ref Range: 3.6 - 5.1 g/dL 4.4     Results for GEREMIAS GAMBOA (MRN [id number]) as of 3/23/2018 12:12   Ref. Range 4/18/2016 10:55   DHEA-SO4 Latest Ref Range: 45.1 - 385.0 ug/dL 105.9     Results for GEREMIAS GAMBOA (MRN [id number]) as of 3/23/2018 14:47   Ref. Range 10/28/2017 11:21   Somatomedin (IGF-I) Latest Ref Range: 91 - 442 ng/mL 137     Results for GEREMIAS GAMBOA (MRN [id number]) as of 3/23/2018 14:47   Ref. Range 11/22/2017 12:15   PSA, SCREEN Latest Ref Range: 0.00 - 4.00 ng/mL <0.01     Results for GEREMIAS GAMBOA (MRN [id number]) as of 3/23/2018 14:47   Ref. Range 11/22/2017 12:15   WBC Latest Ref Range: 3.90 - 12.70 K/uL 6.51   RBC Latest Ref Range: 4.60 - 6.20 M/uL 3.97 (L)   Hemoglobin Latest Ref Range: 14.0 - 18.0 g/dL 11.5 (L)   Hematocrit Latest Ref Range: 40.0 - 54.0 % 34.4 (L)   MCV Latest Ref Range: 82 - 98 fL 87   MCH Latest Ref Range: 27.0 - 31.0 pg 29.0   MCHC Latest Ref Range: 32.0 - 36.0 g/dL 33.4   RDW Latest Ref Range: 11.5 - 14.5 % 12.5   Platelets Latest Ref Range: 150 - 350 K/uL 298   MPV Latest Ref Range: 9.2 - 12.9 fL 10.4   Gran% Latest Ref Range: 38.0 - 73.0 % 63.4   Gran # (ANC) Latest Ref Range: 1.8 - 7.7 K/uL 4.1   Immature Granulocytes Latest Ref Range: 0.0 - 0.5 % 0.5   Immature Grans (Abs) Latest Ref Range: 0.00 - 0.04 K/uL 0.03   Lymph% Latest Ref Range: 18.0 - 48.0 % 28.0   Lymph # Latest Ref Range: 1.0 - 4.8 K/uL 1.8   Mono% Latest Ref Range: 4.0 - 15.0 % 6.1   Mono # Latest Ref Range: 0.3 - 1.0 K/uL 0.4   Eosinophil%  Latest Ref Range: 0.0 - 8.0 % 1.5   Eos # Latest Ref Range: 0.0 - 0.5 K/uL 0.1   Basophil% Latest Ref Range: 0.0 - 1.9 % 0.5   Baso # Latest Ref Range: 0.00 - 0.20 K/uL 0.03     Assessment:     1. Panhypopituitarism  Cortisol, 8AM    ACTH    DXA Bone Density Spine And Hip    Basic metabolic panel   2. Central hypothyroidism  T4, free   3. Vitamin D deficiency disease  Vitamin D   4. HGHG (hypogonadotropic hypogonadism)  CBC auto differential    Testosterone    DXA Bone Density Spine And Hip    testosterone cypionate (DEPOTESTOTERONE CYPIONATE) 200 mg/mL injection   5. GHD (growth hormone deficiency)  Insulin-like growth factor    DXA Bone Density Spine And Hip        ASSESSMENT AND PLAN:     Very interesting case Mr.Tyler TORREY Berkowitz is a college  Student. 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 Panhypopituitarism-   Secondary adrenal insufficiency-   Continue  Hydrocortisone   Reviewed times of increased dose - sick day precautions handout provided and reviewed  Needs medical alert tag- advised to purchase ASAP before returning to school   Follow symptoms and labs      2. Secondary hypothyroidism - clinically and biochemically euthyroid  Follow ft4 and adjust accordingly  Continue Levothyroxine 75 mcg once daily     3. Vitamin d deficiency   Taking Ergo as prescribed   Check labs , will treat accordingly     4. Hypogonadotrophic hypogonadism  Continue testosterone 50 mg monthly   Check CBC with next set of labs   Will arrange BMD prior to leaving for school in 08/2018  No erections less likely making sperms, can not provide semen sample for analysis     5. Secondary AGHD - reviewed risks and benefits of therapy   Continue norditropin 1.5 mg daily   Follow igf-1 levels         Sodium normal no s/s of DI       We will call with results

## 2018-07-03 ENCOUNTER — LAB VISIT (OUTPATIENT)
Dept: LAB | Facility: HOSPITAL | Age: 23
End: 2018-07-03
Payer: COMMERCIAL

## 2018-07-03 DIAGNOSIS — E03.8 CENTRAL HYPOTHYROIDISM: ICD-10-CM

## 2018-07-03 DIAGNOSIS — E23.0 GHD (GROWTH HORMONE DEFICIENCY): ICD-10-CM

## 2018-07-03 DIAGNOSIS — E55.9 VITAMIN D DEFICIENCY DISEASE: ICD-10-CM

## 2018-07-03 DIAGNOSIS — E23.0 PANHYPOPITUITARISM: ICD-10-CM

## 2018-07-03 DIAGNOSIS — E23.0 HGHG (HYPOGONADOTROPIC HYPOGONADISM): ICD-10-CM

## 2018-07-03 LAB
25(OH)D3+25(OH)D2 SERPL-MCNC: 14 NG/ML
ANION GAP SERPL CALC-SCNC: 10 MMOL/L
BASOPHILS # BLD AUTO: 0.02 K/UL
BASOPHILS NFR BLD: 0.4 %
BUN SERPL-MCNC: 7 MG/DL
CALCIUM SERPL-MCNC: 9.6 MG/DL
CHLORIDE SERPL-SCNC: 108 MMOL/L
CO2 SERPL-SCNC: 24 MMOL/L
CORTIS SERPL-MCNC: 17.6 UG/DL
CREAT SERPL-MCNC: 0.7 MG/DL
DIFFERENTIAL METHOD: ABNORMAL
EOSINOPHIL # BLD AUTO: 0.3 K/UL
EOSINOPHIL NFR BLD: 6.4 %
ERYTHROCYTE [DISTWIDTH] IN BLOOD BY AUTOMATED COUNT: 12.2 %
EST. GFR  (AFRICAN AMERICAN): >60 ML/MIN/1.73 M^2
EST. GFR  (NON AFRICAN AMERICAN): >60 ML/MIN/1.73 M^2
GLUCOSE SERPL-MCNC: 119 MG/DL
HCT VFR BLD AUTO: 32.4 %
HGB BLD-MCNC: 10.8 G/DL
IMM GRANULOCYTES # BLD AUTO: 0.01 K/UL
IMM GRANULOCYTES NFR BLD AUTO: 0.2 %
LYMPHOCYTES # BLD AUTO: 2.4 K/UL
LYMPHOCYTES NFR BLD: 45.3 %
MCH RBC QN AUTO: 29.8 PG
MCHC RBC AUTO-ENTMCNC: 33.3 G/DL
MCV RBC AUTO: 89 FL
MONOCYTES # BLD AUTO: 0.4 K/UL
MONOCYTES NFR BLD: 7.8 %
NEUTROPHILS # BLD AUTO: 2.1 K/UL
NEUTROPHILS NFR BLD: 39.9 %
NRBC BLD-RTO: 0 /100 WBC
PLATELET # BLD AUTO: 241 K/UL
PMV BLD AUTO: 10.8 FL
POTASSIUM SERPL-SCNC: 3.4 MMOL/L
RBC # BLD AUTO: 3.63 M/UL
SODIUM SERPL-SCNC: 142 MMOL/L
T4 FREE SERPL-MCNC: 0.74 NG/DL
TESTOST SERPL-MCNC: 12 NG/DL
WBC # BLD AUTO: 5.28 K/UL

## 2018-07-03 PROCEDURE — 82024 ASSAY OF ACTH: CPT

## 2018-07-03 PROCEDURE — 82306 VITAMIN D 25 HYDROXY: CPT

## 2018-07-03 PROCEDURE — 36415 COLL VENOUS BLD VENIPUNCTURE: CPT

## 2018-07-03 PROCEDURE — 84439 ASSAY OF FREE THYROXINE: CPT

## 2018-07-03 PROCEDURE — 84403 ASSAY OF TOTAL TESTOSTERONE: CPT

## 2018-07-03 PROCEDURE — 82533 TOTAL CORTISOL: CPT

## 2018-07-03 PROCEDURE — 85025 COMPLETE CBC W/AUTO DIFF WBC: CPT

## 2018-07-03 PROCEDURE — 84305 ASSAY OF SOMATOMEDIN: CPT

## 2018-07-03 PROCEDURE — 80048 BASIC METABOLIC PNL TOTAL CA: CPT

## 2018-07-05 ENCOUNTER — TELEPHONE (OUTPATIENT)
Dept: ENDOCRINOLOGY | Facility: CLINIC | Age: 23
End: 2018-07-05

## 2018-07-05 DIAGNOSIS — E23.0 HGHG (HYPOGONADOTROPIC HYPOGONADISM): Primary | ICD-10-CM

## 2018-07-05 RX ORDER — TESTOSTERONE CYPIONATE 200 MG/ML
100 INJECTION, SOLUTION INTRAMUSCULAR
Qty: 10 ML | Refills: 0
Start: 2018-07-05 | End: 2018-07-30 | Stop reason: SDUPTHER

## 2018-07-05 NOTE — TELEPHONE ENCOUNTER
Case reviewed with Dr. Romo. Serum testosterone remains below goal. Will increase testosterone to 100 mg every 28 days. Notified patient's mother. Given instructions on medication changes. She voiced understanding. Also informed to continue Ergo 50 K once weekly and start over the counter vitamin D3 at 1,000 IU's once daily as vitamin D level remains below goal. Will check testosterone in 3 weeks

## 2018-07-06 ENCOUNTER — HOSPITAL ENCOUNTER (OUTPATIENT)
Dept: RADIOLOGY | Facility: CLINIC | Age: 23
Discharge: HOME | End: 2018-07-06
Attending: INTERNAL MEDICINE
Payer: COMMERCIAL

## 2018-07-06 DIAGNOSIS — E23.0 HGHG (HYPOGONADOTROPIC HYPOGONADISM): ICD-10-CM

## 2018-07-06 DIAGNOSIS — E23.0 PANHYPOPITUITARISM: ICD-10-CM

## 2018-07-06 DIAGNOSIS — E23.0 GHD (GROWTH HORMONE DEFICIENCY): ICD-10-CM

## 2018-07-06 LAB — ACTH PLAS-MCNC: 34 PG/ML

## 2018-07-06 PROCEDURE — 77080 DXA BONE DENSITY AXIAL: CPT | Mod: 26,,, | Performed by: INTERNAL MEDICINE

## 2018-07-06 PROCEDURE — 77080 DXA BONE DENSITY AXIAL: CPT | Mod: TC

## 2018-07-07 DIAGNOSIS — E55.9 VITAMIN D DEFICIENCY DISEASE: ICD-10-CM

## 2018-07-08 LAB
IGF-I SERPL-MCNC: 170 NG/ML (ref 91–442)
IGF-I Z-SCORE SERPL: -0.84 SD

## 2018-07-08 RX ORDER — ERGOCALCIFEROL 1.25 MG/1
CAPSULE ORAL
Qty: 12 CAPSULE | Refills: 2 | Status: SHIPPED | OUTPATIENT
Start: 2018-07-08 | End: 2020-06-15

## 2018-07-12 ENCOUNTER — PATIENT MESSAGE (OUTPATIENT)
Dept: ENDOCRINOLOGY | Facility: CLINIC | Age: 23
End: 2018-07-12

## 2018-07-12 DIAGNOSIS — E23.0 HGHG (HYPOGONADOTROPIC HYPOGONADISM): ICD-10-CM

## 2018-07-25 ENCOUNTER — TELEPHONE (OUTPATIENT)
Dept: PHARMACY | Facility: CLINIC | Age: 23
End: 2018-07-25

## 2018-07-27 ENCOUNTER — LAB VISIT (OUTPATIENT)
Dept: LAB | Facility: HOSPITAL | Age: 23
End: 2018-07-27
Payer: COMMERCIAL

## 2018-07-27 DIAGNOSIS — E23.0 HGHG (HYPOGONADOTROPIC HYPOGONADISM): ICD-10-CM

## 2018-07-27 LAB — TESTOST SERPL-MCNC: NORMAL NG/DL

## 2018-07-27 PROCEDURE — 36415 COLL VENOUS BLD VENIPUNCTURE: CPT

## 2018-07-27 PROCEDURE — 84403 ASSAY OF TOTAL TESTOSTERONE: CPT

## 2018-07-30 LAB — MAYO MISCELLANEOUS RESULT (REF): NORMAL

## 2018-08-01 RX ORDER — TESTOSTERONE CYPIONATE 200 MG/ML
100 INJECTION, SOLUTION INTRAMUSCULAR
Qty: 10 ML | Refills: 0 | Status: SHIPPED | OUTPATIENT
Start: 2018-08-01 | End: 2020-06-15

## 2018-08-20 NOTE — TELEPHONE ENCOUNTER
FOR DOCUMENTATION ONLY:    Financial Assistance for Norditropin approved from 8/1/18 to 8/1/19     Source [employer] Card     BIN: [id number]   RENUKAN: TIFFANIE  Id: [id number]   GRP: [id number]

## 2018-08-22 ENCOUNTER — TELEPHONE (OUTPATIENT)
Dept: PHARMACY | Facility: CLINIC | Age: 23
End: 2018-08-22

## 2018-09-03 ENCOUNTER — PATIENT MESSAGE (OUTPATIENT)
Dept: ENDOCRINOLOGY | Facility: CLINIC | Age: 23
End: 2018-09-03

## 2018-09-04 ENCOUNTER — PATIENT MESSAGE (OUTPATIENT)
Dept: ENDOCRINOLOGY | Facility: CLINIC | Age: 23
End: 2018-09-04

## 2018-09-18 ENCOUNTER — TELEPHONE (OUTPATIENT)
Dept: PHARMACY | Facility: CLINIC | Age: 23
End: 2018-09-18

## 2018-10-18 ENCOUNTER — TELEPHONE (OUTPATIENT)
Dept: PHARMACY | Facility: CLINIC | Age: 23
End: 2018-10-18

## 2018-10-19 DIAGNOSIS — E23.0 GHD (GROWTH HORMONE DEFICIENCY): Primary | ICD-10-CM

## 2018-11-13 ENCOUNTER — TELEPHONE (OUTPATIENT)
Dept: PHARMACY | Facility: CLINIC | Age: 23
End: 2018-11-13

## 2018-11-15 ENCOUNTER — TELEPHONE (OUTPATIENT)
Dept: PHARMACY | Facility: CLINIC | Age: 23
End: 2018-11-15

## 2018-12-10 ENCOUNTER — TELEPHONE (OUTPATIENT)
Dept: PHARMACY | Facility: CLINIC | Age: 23
End: 2018-12-10

## 2018-12-13 ENCOUNTER — OFFICE VISIT (OUTPATIENT)
Dept: INTERNAL MEDICINE | Facility: CLINIC | Age: 23
End: 2018-12-13
Payer: COMMERCIAL

## 2018-12-13 ENCOUNTER — IMMUNIZATION (OUTPATIENT)
Dept: INTERNAL MEDICINE | Facility: CLINIC | Age: 23
End: 2018-12-13
Payer: COMMERCIAL

## 2018-12-13 VITALS
BODY MASS INDEX: 26.41 KG/M2 | SYSTOLIC BLOOD PRESSURE: 104 MMHG | WEIGHT: 134.5 LBS | DIASTOLIC BLOOD PRESSURE: 72 MMHG | HEART RATE: 66 BPM | HEIGHT: 60 IN

## 2018-12-13 DIAGNOSIS — J45.909 ASTHMA, UNSPECIFIED ASTHMA SEVERITY, UNSPECIFIED WHETHER COMPLICATED, UNSPECIFIED WHETHER PERSISTENT: ICD-10-CM

## 2018-12-13 DIAGNOSIS — L98.9 SKIN LESION: ICD-10-CM

## 2018-12-13 DIAGNOSIS — Z00.00 ENCOUNTER FOR PREVENTIVE HEALTH EXAMINATION: Primary | ICD-10-CM

## 2018-12-13 PROCEDURE — 90686 IIV4 VACC NO PRSV 0.5 ML IM: CPT | Mod: S$GLB,,, | Performed by: INTERNAL MEDICINE

## 2018-12-13 PROCEDURE — 90471 IMMUNIZATION ADMIN: CPT | Mod: S$GLB,,, | Performed by: INTERNAL MEDICINE

## 2018-12-13 PROCEDURE — 99999 PR PBB SHADOW E&M-EST. PATIENT-LVL IV: CPT | Mod: PBBFAC,,, | Performed by: PHYSICIAN ASSISTANT

## 2018-12-13 PROCEDURE — 99395 PREV VISIT EST AGE 18-39: CPT | Mod: S$GLB,,, | Performed by: PHYSICIAN ASSISTANT

## 2018-12-13 RX ORDER — ALBUTEROL SULFATE 90 UG/1
1-2 AEROSOL, METERED RESPIRATORY (INHALATION)
Qty: 1 INHALER | Refills: 3 | Status: SHIPPED | OUTPATIENT
Start: 2018-12-13 | End: 2018-12-13 | Stop reason: SDUPTHER

## 2018-12-13 RX ORDER — ALBUTEROL SULFATE 90 UG/1
1-2 AEROSOL, METERED RESPIRATORY (INHALATION)
Qty: 1 INHALER | Refills: 3 | Status: SHIPPED | OUTPATIENT
Start: 2018-12-13 | End: 2019-06-12 | Stop reason: SDUPTHER

## 2018-12-13 NOTE — PROGRESS NOTES
Subjective:       Patient ID: Milton Berkowitz is a 23 y.o. male.        Chief Complaint: Annual Exam    Milton Berkowitz is an established patient of Zenaida Duffy MD (Inactive) here today for annual exam.    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    Panhypopituitarism: followed by endocrinology, will schedule f/u while home for holiday break.    Left ear lesion: appeared after getting ears re-pierced in 12/2017, the lesion gets larger/smaller, somewhat painful at times, worsens when he wears earrings.             Review of Systems   Constitutional: Negative for appetite change, chills, fatigue and fever.   HENT: Negative for congestion and sore throat.    Eyes: Negative for visual disturbance.   Respiratory: Negative for cough, chest tightness and shortness of breath.    Cardiovascular: Negative for chest pain, palpitations and leg swelling.   Gastrointestinal: Negative for abdominal pain, blood in stool, constipation, diarrhea, nausea and vomiting.   Genitourinary: Negative for dysuria, frequency, hematuria and urgency.   Musculoskeletal: Negative for arthralgias and back pain.   Skin: Negative for rash.        Left ear skin lesion   Neurological: Negative for dizziness, syncope, weakness and headaches.   Psychiatric/Behavioral: Negative for dysphoric mood and sleep disturbance. The patient is not nervous/anxious.        Objective:      Physical Exam   Constitutional: He appears well-developed and well-nourished.   HENT:   Head: Normocephalic.   Right Ear: External ear normal.   Left Ear: External ear normal.   Mouth/Throat: Oropharynx is clear and moist.   Posterior earlobe with 0.75 cm hardened punctate lesion, non tender   Eyes: Pupils are equal, round, and reactive to light.   Cardiovascular: Normal rate, regular rhythm and normal heart sounds. Exam reveals no gallop and no friction rub.   No murmur heard.  Pulmonary/Chest:  "Effort normal and breath sounds normal. No respiratory distress.   Abdominal: Soft. There is no tenderness.   Musculoskeletal: He exhibits no edema.   Neurological: He is alert.   Skin: Skin is warm and dry.   Psychiatric: He has a normal mood and affect.   Nursing note and vitals reviewed.      Assessment:       1. Encounter for preventive health examination    2. Asthma, unspecified asthma severity, unspecified whether complicated, unspecified whether persistent    3. Skin lesion        Plan:       Milton was seen today for annual exam.    Diagnoses and all orders for this visit:    Encounter for preventive health examination  -     Lipid panel; Future    Asthma, unspecified asthma severity, unspecified whether complicated, unspecified whether persistent  -     REFILL: albuterol (PROVENTIL/VENTOLIN HFA) 90 mcg/actuation inhaler; Inhale 1-2 puffs into the lungs every 4 to 6 hours as needed for Wheezing or Shortness of Breath.    Skin lesion of left ear  -     Ambulatory referral to Dermatology    Flu shot today.    He will consider HPV series and perhaps get administered at school clinic.      Schedule f/u with endo while home for break.      Pt has been given instructions populated from [employer] database and has verbalized understanding of the after visit summary and information contained wherein.    Follow up with a primary care provider. May go to ER for acute shortness of breath, lightheadedness, fever, or any other emergent complaints or changes in condition.    "This note will be shared with the patient"    Future Appointments   Date Time Provider Department Center   4/12/2019 11:15 AM [facility]               "

## 2018-12-13 NOTE — PATIENT INSTRUCTIONS
Schedule an appointment with Shala Kunz for your routine follow up with endocrinology    Your tetanus vaccine will be due next year    HPV vaccine series      Prevention Guidelines, Men Ages 18 to 39  Screening tests and vaccines are an important part of managing your health. Health counseling is essential, too. Below are guidelines for these, for men ages 18 to 39. Talk with your healthcare provider to make sure youre up-to-date on what you need.  Screening Who needs it How often   Alcohol misuse All men in this age group At routine exams   Blood pressure All men in this age group Every 2 years if your blood pressure is less than 120/80 mm Hg; yearly if your systolic blood pressure is 120 to 139 mm Hg, or your diastolic blood pressure reading is 80 to 89 mm Hg   Depression All men in this age group At routine exams   Diabetes mellitus, type 2 Adults who have no symptoms but are overweight or obese and have 1 or more other risk factors for diabetes At least every 3 years (yearly if blood sugar has already started to rise)   Hepatitis C If at increased risk At routine exams   High cholesterol or triglycerides All men ages 35 and older, and younger men at high risk for coronary artery disease At least every 5 years   HIV All men At routine exams   Obesity All men in this age group At routine exams   Syphilis Men at increased risk for infection - talk with your healthcare provider At routine exams   Tuberculosis Men at increased risk for infection - talk with your healthcare provider Check with your healthcare provider   Vision All men in this age group Every 5 to 10 years if no risk factors for eye disease   Vaccines Who needs it How often   Chickenpox (varicella) All men in this age group who have no record of this infection or vaccine 2 doses; the second dose should be given at least 4 weeks after the first dose   Hepatitis A Men at increased risk for infection - talk with your healthcare provider 2  doses given at least 6 months apart   Hepatitis B Men at increased risk for infection - talk with your healthcare provider 3 doses over 6 months; second dose should be given 1 month after the first dose; the third dose should be given at least 2 months after the second dose and at least 4 months after the first dose   Haemophilus influenzae Type B (HIB) Men at increased risk for infection - talk with your healthcare provider 1 to 3 doses   Human papillomavirus (HPV) All men in this age group up to age 26 3 doses; the second dose should be given 1 to 2 months after the first dose and the third dose given 6 months after the first dose   Influenza (flu) All men in this age group Once a year   Measles, mumps, rubella (MMR) All men in this age group who have no record of these infections or vaccines 1 or 2 doses through age 55   Meningococcal Men at increased risk for infection - talk with your healthcare provider 1 or more doses   Pneumococca (PCV13) and Pneumococcal (PPSV23) Men at increased risk for infection - talk with your healthcare provider PCV13: 1 dose ages 19 to 65 (protects against 13 types of pneumococcal bacteria)  PPSV23: 1 to 2 doses through age 64, or 1 dose at 65 or older (protects against 23 types of pneumococcal bacteria)   Tetanus/diphtheria/pertussis (Td/Tdap) booster All men in this age group A one-time Tdap booster after age 18, then Td every10 years   Counseling Who needs it How often   Diet and exercise Overweight or obese people When diagnosed, and then at routine exams   Use of tobacco and the health effects it can cause All men in this age group Every visit   Sexually transmitted infection prevention Men who are sexually active At routine exams   Skin cancer Prevention of skin cancer in fair-skinned adults through age 24 At routine exams   1Those who are 18 years of age, who are not up-to-date on their childhood immunizations, should receive all appropriate catch-up vaccines recommended by  the CDC.  Date Last Reviewed: 2/1/2017  © 8775-9264 [employer]. [address] [place] [address], [place] [address]. All rights reserved. This information is not intended as a substitute for professional medical care. Always follow your healthcare professional's instructions.

## 2019-01-11 ENCOUNTER — TELEPHONE (OUTPATIENT)
Dept: PHARMACY | Facility: CLINIC | Age: 24
End: 2019-01-11

## 2019-01-11 DIAGNOSIS — E23.0 GHD (GROWTH HORMONE DEFICIENCY): ICD-10-CM

## 2019-01-11 NOTE — TELEPHONE ENCOUNTER
----- Message from Ene Mann sent at 1/11/2019  9:23 AM CST -----  Contact: [facility]  .Rx Refill/Request     Is this a Refill or New Rx:  Refill  Rx Name and Strength:  somatropin (NORDITROPIN FLEXPRO) 10 mg/1.5 mL (6.7 mg/mL) PnIj  Preferred Pharmacy with phone number:  [facility]  Communication Preference:  [phone]  Additional Information:

## 2019-02-12 ENCOUNTER — TELEPHONE (OUTPATIENT)
Dept: PHARMACY | Facility: CLINIC | Age: 24
End: 2019-02-12

## 2019-03-08 ENCOUNTER — TELEPHONE (OUTPATIENT)
Dept: PHARMACY | Facility: CLINIC | Age: 24
End: 2019-03-08

## 2019-04-03 DIAGNOSIS — E23.0 GHD (GROWTH HORMONE DEFICIENCY): ICD-10-CM

## 2019-04-09 ENCOUNTER — TELEPHONE (OUTPATIENT)
Dept: PHARMACY | Facility: CLINIC | Age: 24
End: 2019-04-09

## 2019-04-09 NOTE — TELEPHONE ENCOUNTER
Norditropin follow up and refill. Confirmed two patient identifiers with Mom - name + . She declines follow up since patient does not reside at home - he is off at school in [place], so she arranges shipments to herself and then ships it to him at school. She could not give an exact dose count because of this, but wanted to arrange delivery ASAP so she can coordinate delivery to him before he runs out  of medication. She confirms no changes to allergies, health conditions, medications or insurance. She denies any side effects and unaware of missed doses. [facility]SP to ship Norditropin on 4/10/19 to arrive at mom's house on 19 via [employer]. Address confirmed, $0 copay. Pen needles and OH wipes needed - flags added in [place]. All questions answered to patient's satisfaction. [facility] will continue to f/u monthly for refills. TTN

## 2019-05-02 ENCOUNTER — TELEPHONE (OUTPATIENT)
Dept: PHARMACY | Facility: CLINIC | Age: 24
End: 2019-05-02

## 2019-05-28 ENCOUNTER — TELEPHONE (OUTPATIENT)
Dept: PHARMACY | Facility: CLINIC | Age: 24
End: 2019-05-28

## 2019-06-12 ENCOUNTER — OFFICE VISIT (OUTPATIENT)
Dept: INTERNAL MEDICINE | Facility: CLINIC | Age: 24
End: 2019-06-12
Payer: COMMERCIAL

## 2019-06-12 VITALS
SYSTOLIC BLOOD PRESSURE: 108 MMHG | WEIGHT: 144.63 LBS | DIASTOLIC BLOOD PRESSURE: 80 MMHG | BODY MASS INDEX: 28.39 KG/M2 | HEIGHT: 60 IN | HEART RATE: 98 BPM

## 2019-06-12 DIAGNOSIS — J45.909 ASTHMA, UNSPECIFIED ASTHMA SEVERITY, UNSPECIFIED WHETHER COMPLICATED, UNSPECIFIED WHETHER PERSISTENT: ICD-10-CM

## 2019-06-12 PROCEDURE — 3008F BODY MASS INDEX DOCD: CPT | Mod: CPTII,S$GLB,, | Performed by: PHYSICIAN ASSISTANT

## 2019-06-12 PROCEDURE — 99999 PR PBB SHADOW E&M-EST. PATIENT-LVL III: CPT | Mod: PBBFAC,,, | Performed by: PHYSICIAN ASSISTANT

## 2019-06-12 PROCEDURE — 99213 PR OFFICE/OUTPT VISIT, EST, LEVL III, 20-29 MIN: ICD-10-PCS | Mod: S$GLB,,, | Performed by: PHYSICIAN ASSISTANT

## 2019-06-12 PROCEDURE — 99213 OFFICE O/P EST LOW 20 MIN: CPT | Mod: S$GLB,,, | Performed by: PHYSICIAN ASSISTANT

## 2019-06-12 PROCEDURE — 3008F PR BODY MASS INDEX (BMI) DOCUMENTED: ICD-10-PCS | Mod: CPTII,S$GLB,, | Performed by: PHYSICIAN ASSISTANT

## 2019-06-12 PROCEDURE — 99999 PR PBB SHADOW E&M-EST. PATIENT-LVL III: ICD-10-PCS | Mod: PBBFAC,,, | Performed by: PHYSICIAN ASSISTANT

## 2019-06-12 RX ORDER — ALBUTEROL SULFATE 90 UG/1
1-2 AEROSOL, METERED RESPIRATORY (INHALATION)
Qty: 1 INHALER | Refills: 3 | Status: SHIPPED | OUTPATIENT
Start: 2019-06-12 | End: 2020-06-11

## 2019-06-25 ENCOUNTER — LAB VISIT (OUTPATIENT)
Dept: LAB | Facility: HOSPITAL | Age: 24
End: 2019-06-25
Attending: INTERNAL MEDICINE
Payer: COMMERCIAL

## 2019-06-25 ENCOUNTER — OFFICE VISIT (OUTPATIENT)
Dept: ENDOCRINOLOGY | Facility: CLINIC | Age: 24
End: 2019-06-25
Payer: COMMERCIAL

## 2019-06-25 VITALS
HEART RATE: 90 BPM | BODY MASS INDEX: 29.08 KG/M2 | HEIGHT: 60 IN | SYSTOLIC BLOOD PRESSURE: 106 MMHG | WEIGHT: 148.13 LBS | DIASTOLIC BLOOD PRESSURE: 70 MMHG

## 2019-06-25 DIAGNOSIS — E23.0 PANHYPOPITUITARISM: ICD-10-CM

## 2019-06-25 DIAGNOSIS — E03.8 CENTRAL HYPOTHYROIDISM: ICD-10-CM

## 2019-06-25 DIAGNOSIS — E23.0 PANHYPOPITUITARISM: Primary | ICD-10-CM

## 2019-06-25 DIAGNOSIS — E23.0 HGHG (HYPOGONADOTROPIC HYPOGONADISM): ICD-10-CM

## 2019-06-25 LAB
ALBUMIN SERPL BCP-MCNC: 4 G/DL (ref 3.5–5.2)
ALP SERPL-CCNC: 120 U/L (ref 55–135)
ALT SERPL W/O P-5'-P-CCNC: 9 U/L (ref 10–44)
ANION GAP SERPL CALC-SCNC: 9 MMOL/L (ref 8–16)
ANISOCYTOSIS BLD QL SMEAR: SLIGHT
AST SERPL-CCNC: 21 U/L (ref 10–40)
BASOPHILS # BLD AUTO: 0.04 K/UL (ref 0–0.2)
BASOPHILS NFR BLD: 0.9 % (ref 0–1.9)
BILIRUB SERPL-MCNC: 0.4 MG/DL (ref 0.1–1)
BUN SERPL-MCNC: 13 MG/DL (ref 6–20)
CALCIUM SERPL-MCNC: 10.1 MG/DL (ref 8.7–10.5)
CHLORIDE SERPL-SCNC: 102 MMOL/L (ref 95–110)
CO2 SERPL-SCNC: 27 MMOL/L (ref 23–29)
CREAT SERPL-MCNC: 0.7 MG/DL (ref 0.5–1.4)
DIFFERENTIAL METHOD: ABNORMAL
EOSINOPHIL # BLD AUTO: 0.5 K/UL (ref 0–0.5)
EOSINOPHIL NFR BLD: 9.7 % (ref 0–8)
ERYTHROCYTE [DISTWIDTH] IN BLOOD BY AUTOMATED COUNT: 11.9 % (ref 11.5–14.5)
EST. GFR  (AFRICAN AMERICAN): >60 ML/MIN/1.73 M^2
EST. GFR  (NON AFRICAN AMERICAN): >60 ML/MIN/1.73 M^2
GIANT PLATELETS BLD QL SMEAR: PRESENT
GLUCOSE SERPL-MCNC: 78 MG/DL (ref 70–110)
HCT VFR BLD AUTO: 33.5 % (ref 40–54)
HGB BLD-MCNC: 11 G/DL (ref 14–18)
HYPOCHROMIA BLD QL SMEAR: ABNORMAL
IMM GRANULOCYTES # BLD AUTO: 0.01 K/UL (ref 0–0.04)
IMM GRANULOCYTES NFR BLD AUTO: 0.2 % (ref 0–0.5)
LYMPHOCYTES # BLD AUTO: 1.9 K/UL (ref 1–4.8)
LYMPHOCYTES NFR BLD: 40.2 % (ref 18–48)
MCH RBC QN AUTO: 29 PG (ref 27–31)
MCHC RBC AUTO-ENTMCNC: 32.8 G/DL (ref 32–36)
MCV RBC AUTO: 88 FL (ref 82–98)
MONOCYTES # BLD AUTO: 0.4 K/UL (ref 0.3–1)
MONOCYTES NFR BLD: 7.7 % (ref 4–15)
NEUTROPHILS # BLD AUTO: 1.9 K/UL (ref 1.8–7.7)
NEUTROPHILS NFR BLD: 41.3 % (ref 38–73)
NRBC BLD-RTO: 0 /100 WBC
OVALOCYTES BLD QL SMEAR: ABNORMAL
PLATELET # BLD AUTO: 238 K/UL (ref 150–350)
PLATELET BLD QL SMEAR: ABNORMAL
PMV BLD AUTO: 10.1 FL (ref 9.2–12.9)
POIKILOCYTOSIS BLD QL SMEAR: SLIGHT
POLYCHROMASIA BLD QL SMEAR: ABNORMAL
POTASSIUM SERPL-SCNC: 4.2 MMOL/L (ref 3.5–5.1)
PROLACTIN SERPL IA-MCNC: 14.2 NG/ML (ref 3.5–19.4)
PROT SERPL-MCNC: 7.9 G/DL (ref 6–8.4)
RBC # BLD AUTO: 3.79 M/UL (ref 4.6–6.2)
SODIUM SERPL-SCNC: 138 MMOL/L (ref 136–145)
T4 FREE SERPL-MCNC: 0.67 NG/DL (ref 0.71–1.51)
TESTOST SERPL-MCNC: 10 NG/DL (ref 304–1227)
TOXIC GRANULES BLD QL SMEAR: PRESENT
TSH SERPL DL<=0.005 MIU/L-ACNC: 2.75 UIU/ML (ref 0.4–4)
WBC # BLD AUTO: 4.65 K/UL (ref 3.9–12.7)

## 2019-06-25 PROCEDURE — 80053 COMPREHEN METABOLIC PANEL: CPT

## 2019-06-25 PROCEDURE — 84443 ASSAY THYROID STIM HORMONE: CPT

## 2019-06-25 PROCEDURE — 99999 PR PBB SHADOW E&M-EST. PATIENT-LVL III: ICD-10-PCS | Mod: PBBFAC,,, | Performed by: INTERNAL MEDICINE

## 2019-06-25 PROCEDURE — 99214 PR OFFICE/OUTPT VISIT, EST, LEVL IV, 30-39 MIN: ICD-10-PCS | Mod: S$GLB,,, | Performed by: INTERNAL MEDICINE

## 2019-06-25 PROCEDURE — 85025 COMPLETE CBC W/AUTO DIFF WBC: CPT

## 2019-06-25 PROCEDURE — 84146 ASSAY OF PROLACTIN: CPT

## 2019-06-25 PROCEDURE — 3008F PR BODY MASS INDEX (BMI) DOCUMENTED: ICD-10-PCS | Mod: CPTII,S$GLB,, | Performed by: INTERNAL MEDICINE

## 2019-06-25 PROCEDURE — 84305 ASSAY OF SOMATOMEDIN: CPT

## 2019-06-25 PROCEDURE — 84439 ASSAY OF FREE THYROXINE: CPT

## 2019-06-25 PROCEDURE — 99999 PR PBB SHADOW E&M-EST. PATIENT-LVL III: CPT | Mod: PBBFAC,,, | Performed by: INTERNAL MEDICINE

## 2019-06-25 PROCEDURE — 36415 COLL VENOUS BLD VENIPUNCTURE: CPT

## 2019-06-25 PROCEDURE — 99214 OFFICE O/P EST MOD 30 MIN: CPT | Mod: S$GLB,,, | Performed by: INTERNAL MEDICINE

## 2019-06-25 PROCEDURE — 84403 ASSAY OF TOTAL TESTOSTERONE: CPT

## 2019-06-25 PROCEDURE — 3008F BODY MASS INDEX DOCD: CPT | Mod: CPTII,S$GLB,, | Performed by: INTERNAL MEDICINE

## 2019-06-25 RX ORDER — TESTOSTERONE 20.25 MG/1.25G
1 GEL TOPICAL DAILY
Qty: 30 PACKET | Refills: 5 | Status: SHIPPED | OUTPATIENT
Start: 2019-06-25 | End: 2019-11-27 | Stop reason: SDUPTHER

## 2019-06-25 NOTE — PROGRESS NOTES
"Subjective:      Patient ID: Milton Berkowitz is a 24 y.o. male.    Chief Complaint:  Follow-up      History of Present Illness  New to me     He was diagnosed with panhypopituitarism at age 4        Established care here in 11/2017       mri 7/24/17  FINDINGS:  Sella: Normal posterior pituitary bright spot is absent.  Additionally, the infundibulum is not identifiable.  A 4 mm rounded focus of T1 hyperintense signal is present at the level of the median eminence of the hypothalamus suggesting ectopic location of the neurohypophysis.  Pituitary tissue within the sella appears otherwise essentially normal, though at the lower limit of normal size for patient's age        Mom : 5'5"   Dad:  5"6"     Current regimen:   Hydrocortisone 5mg tablets - takes 10 mg every morning and 5 mg every evening   Levothyroxine 75 mcg   Norditropin 1.5 mg daily   Testosterone 50 mg IM every 28 days      Patient reports taking all medications as prescribed    he believes he is growing   Rare erections  Not dating   Would like more virilization   Does not get trt on time as he is at school and there is sometimes difficulty getting access     He was having polyuria / nocturia was on DDAVP oral but not anymore   Denies polyuria/poldypsia at this time      He denies recent falls or fractures   bmd 7/6/18    Impression       Bone density lower than expected for age.   Please note that peak bone mass may not be achieved at the spine yet, given pt's biologic age.     With regards to the vitamin d deficiency:  Currently not taking otc 2000 iu a day     In [employer] - [place] - senior - criminal justice - wants to work with the [employer]       Review of Systems   Constitutional: Negative for unexpected weight change.   Eyes: Negative for visual disturbance.   Respiratory: Negative for shortness of breath.    Cardiovascular: Negative for chest pain.   Gastrointestinal: Negative for abdominal pain.   Musculoskeletal: Negative for myalgias.   Skin: Negative " for wound.   Neurological: Negative for headaches.   Hematological: Does not bruise/bleed easily.   Psychiatric/Behavioral: Negative for sleep disturbance.       Objective:   Physical Exam   Neck: No thyromegaly present.   Cardiovascular: Normal rate.   Pulmonary/Chest: Effort normal.   Abdominal: Soft.   Musculoskeletal: He exhibits no edema.   Vitals reviewed.  small phallus and very low testicular volume   Lack of virilization     Body mass index is 29.92 kg/m².    Lab Review:   No results found for: HGBA1C  No results found for: CHOL, HDL, LDLCALC, TRIG, CHOLHDL  Lab Results   Component Value Date     06/25/2019    K 4.2 06/25/2019     06/25/2019    CO2 27 06/25/2019    GLU 78 06/25/2019    BUN 13 06/25/2019    CREATININE 0.7 06/25/2019    CALCIUM 10.1 06/25/2019    PROT 7.9 06/25/2019    ALBUMIN 4.0 06/25/2019    BILITOT 0.4 06/25/2019    ALKPHOS 120 06/25/2019    AST 21 06/25/2019    ALT 9 (L) 06/25/2019    ANIONGAP 9 06/25/2019    ESTGFRAFRICA >60.0 06/25/2019    EGFRNONAA >60.0 06/25/2019    TSH 5.139 (H) 07/14/2017        Assessment and Plan     Panhypopituitarism  Panhypopituitarism-   Secondary adrenal insufficiency-   Continue  Hydrocortisone   Reviewed times of increased dose - sick day precautions handout provided and reviewed  Needs medical alert tag  Follow symptoms and labs      Secondary hypothyroidism - clinically and biochemically euthyroid  Follow ft4 and adjust accordingly  Avoid exogenous hyperthyroidism as this can accelerate bone loss      Secondary hypogonadism- on Testosterone replacement therapy. Plan to maximize dose for puberty but will ensure growth plates are fused . Plan on rechecking labs        Secondary AGHD - reviewed risks and benefits of therapy   May stop   Discussed QOL issues      No evidence of DI-  follow Na+ levels and OUP           Dr Miranda reviewed bone age images and growth plates are not fuses - recheck as this changes mgmt of trt and gh

## 2019-06-25 NOTE — ASSESSMENT & PLAN NOTE
Panhypopituitarism-   Secondary adrenal insufficiency-   Continue  Hydrocortisone   Reviewed times of increased dose - sick day precautions handout provided and reviewed  Needs medical alert tag  Follow symptoms and labs      Secondary hypothyroidism - clinically and biochemically euthyroid  Follow ft4 and adjust accordingly  Avoid exogenous hyperthyroidism as this can accelerate bone loss      Secondary hypogonadism- on Testosterone replacement therapy. Plan to maximize dose for puberty but will ensure growth plates are fused . Plan on rechecking labs        Secondary AGHD - reviewed risks and benefits of therapy   May stop   Discussed QOL issues      No evidence of DI-  follow Na+ levels

## 2019-06-26 ENCOUNTER — TELEPHONE (OUTPATIENT)
Dept: ENDOCRINOLOGY | Facility: CLINIC | Age: 24
End: 2019-06-26

## 2019-06-26 ENCOUNTER — TELEPHONE (OUTPATIENT)
Dept: PHARMACY | Facility: CLINIC | Age: 24
End: 2019-06-26

## 2019-06-26 DIAGNOSIS — E03.8 CENTRAL HYPOTHYROIDISM: ICD-10-CM

## 2019-06-26 RX ORDER — LEVOTHYROXINE SODIUM 88 UG/1
88 TABLET ORAL
Qty: 30 TABLET | Refills: 11 | Status: SHIPPED | OUTPATIENT
Start: 2019-06-26 | End: 2019-11-29

## 2019-06-27 LAB
IGF-I SERPL-MCNC: 35 NG/ML (ref 91–442)
IGF-I Z-SCORE SERPL: -2.33 SD

## 2019-06-28 ENCOUNTER — OFFICE VISIT (OUTPATIENT)
Dept: DERMATOLOGY | Facility: CLINIC | Age: 24
End: 2019-06-28
Payer: COMMERCIAL

## 2019-06-28 DIAGNOSIS — L91.0 KELOID: Primary | ICD-10-CM

## 2019-06-28 PROCEDURE — 11900 INJECT SKIN LESIONS </W 7: CPT | Mod: S$GLB,,, | Performed by: PHYSICIAN ASSISTANT

## 2019-06-28 PROCEDURE — 99999 PR PBB SHADOW E&M-EST. PATIENT-LVL II: CPT | Mod: PBBFAC,,, | Performed by: PHYSICIAN ASSISTANT

## 2019-06-28 PROCEDURE — 11900 PR INJECTION INTO SKIN LESIONS, UP TO 7: ICD-10-PCS | Mod: S$GLB,,, | Performed by: PHYSICIAN ASSISTANT

## 2019-06-28 PROCEDURE — 99999 PR PBB SHADOW E&M-EST. PATIENT-LVL II: ICD-10-PCS | Mod: PBBFAC,,, | Performed by: PHYSICIAN ASSISTANT

## 2019-06-28 PROCEDURE — 99202 OFFICE O/P NEW SF 15 MIN: CPT | Mod: 25,S$GLB,, | Performed by: PHYSICIAN ASSISTANT

## 2019-06-28 PROCEDURE — 99202 PR OFFICE/OUTPT VISIT, NEW, LEVL II, 15-29 MIN: ICD-10-PCS | Mod: 25,S$GLB,, | Performed by: PHYSICIAN ASSISTANT

## 2019-06-28 RX ORDER — TRIAMCINOLONE ACETONIDE 40 MG/ML
40 INJECTION, SUSPENSION INTRA-ARTICULAR; INTRAMUSCULAR
Status: DISCONTINUED | OUTPATIENT
Start: 2019-06-28 | End: 2020-06-15

## 2019-06-28 NOTE — PROGRESS NOTES
Subjective:       Patient ID:  Milton Berkowitz is a 24 y.o. male who presents for   Chief Complaint   Patient presents with    Keloid     Right and left ear     Keloid  - Initial  Affected locations: left ear and right ear  Duration: 1 year  Signs / symptoms: itching (occasionally)  Relieving factors/Treatments tried: nothing        Review of Systems   Constitutional: Negative for fever.   Skin: Positive for tendency to form keloidal scars. Negative for sensitivity to antibiotic ointment and sensitivity to bandage adhesive.   Hematologic/Lymphatic: Does not bruise/bleed easily.        Objective:    Physical Exam   Constitutional: He appears well-developed and well-nourished. No distress.   Neurological: He is alert and oriented to person, place, and time. He is not disoriented.   Psychiatric: He has a normal mood and affect.   Skin:   Areas Examined (abnormalities noted in diagram):   Head / Face Inspection Performed              Diagram Legend     Erythematous scaling macule/papule c/w actinic keratosis       Vascular papule c/w angioma      Pigmented verrucoid papule/plaque c/w seborrheic keratosis      Yellow umbilicated papule c/w sebaceous hyperplasia      Irregularly shaped tan macule c/w lentigo     1-2 mm smooth white papules consistent with Milia      Movable subcutaneous cyst with punctum c/w epidermal inclusion cyst      Subcutaneous movable cyst c/w pilar cyst      Firm pink to brown papule c/w dermatofibroma      Pedunculated fleshy papule(s) c/w skin tag(s)      Evenly pigmented macule c/w junctional nevus     Mildly variegated pigmented, slightly irregular-bordered macule c/w mildly atypical nevus      Flesh colored to evenly pigmented papule c/w intradermal nevus       Pink pearly papule/plaque c/w basal cell carcinoma      Erythematous hyperkeratotic cursted plaque c/w SCC      Surgical scar with no sign of skin cancer recurrence      Open and closed comedones      Inflammatory papules and pustules       Verrucoid papule consistent consistent with wart     Erythematous eczematous patches and plaques     Dystrophic onycholytic nail with subungual debris c/w onychomycosis     Umbilicated papule    Erythematous-base heme-crusted tan verrucoid plaque consistent with inflamed seborrheic keratosis     Erythematous Silvery Scaling Plaque c/w Psoriasis     See annotation      Assessment / Plan:      Keloids  -     triamcinolone acetonide injection 40 mg    Intralesional Kenalog 40mg/cc (0.4 cc total) injected into 2 lesions on the posterior ear lobules today after obtaining verbal consent including risk of surrounding hypopigmentation. Patient tolerated procedure well.    Units:1  NDC for Kenalog 40mg/cc:  7270-0141-86         Follow up in about 1 month (around 7/28/2019).

## 2019-07-02 ENCOUNTER — TELEPHONE (OUTPATIENT)
Dept: PHARMACY | Facility: CLINIC | Age: 24
End: 2019-07-02

## 2019-07-03 ENCOUNTER — HOSPITAL ENCOUNTER (OUTPATIENT)
Dept: RADIOLOGY | Facility: HOSPITAL | Age: 24
Discharge: HOME | End: 2019-07-03
Attending: INTERNAL MEDICINE
Payer: COMMERCIAL

## 2019-07-03 DIAGNOSIS — E23.0 PANHYPOPITUITARISM: ICD-10-CM

## 2019-07-03 PROCEDURE — 77072 BONE AGE STUDIES: CPT | Mod: TC

## 2019-07-03 PROCEDURE — 77072 BONE AGE STUDIES: CPT | Mod: 26,,, | Performed by: RADIOLOGY

## 2019-07-03 PROCEDURE — 77072 XR BONE AGE STUDY: ICD-10-PCS | Mod: 26,,, | Performed by: RADIOLOGY

## 2019-07-05 ENCOUNTER — PATIENT MESSAGE (OUTPATIENT)
Dept: ENDOCRINOLOGY | Facility: CLINIC | Age: 24
End: 2019-07-05

## 2019-07-23 ENCOUNTER — OFFICE VISIT (OUTPATIENT)
Dept: DERMATOLOGY | Facility: CLINIC | Age: 24
End: 2019-07-23
Payer: COMMERCIAL

## 2019-07-23 DIAGNOSIS — L91.0 KELOID: Primary | ICD-10-CM

## 2019-07-23 PROCEDURE — 99999 PR PBB SHADOW E&M-EST. PATIENT-LVL II: CPT | Mod: PBBFAC,,, | Performed by: PHYSICIAN ASSISTANT

## 2019-07-23 PROCEDURE — 99499 NO LOS: ICD-10-PCS | Mod: S$GLB,,, | Performed by: PHYSICIAN ASSISTANT

## 2019-07-23 PROCEDURE — 99499 UNLISTED E&M SERVICE: CPT | Mod: S$GLB,,, | Performed by: PHYSICIAN ASSISTANT

## 2019-07-23 PROCEDURE — 11900 PR INJECTION INTO SKIN LESIONS, UP TO 7: ICD-10-PCS | Mod: S$GLB,,, | Performed by: PHYSICIAN ASSISTANT

## 2019-07-23 PROCEDURE — 11900 INJECT SKIN LESIONS </W 7: CPT | Mod: S$GLB,,, | Performed by: PHYSICIAN ASSISTANT

## 2019-07-23 PROCEDURE — 99999 PR PBB SHADOW E&M-EST. PATIENT-LVL II: ICD-10-PCS | Mod: PBBFAC,,, | Performed by: PHYSICIAN ASSISTANT

## 2019-07-23 RX ORDER — PREDNISONE 10 MG/1
TABLET ORAL
COMMUNITY
Start: 2018-09-30 | End: 2020-06-15

## 2019-07-23 RX ORDER — TRIAMCINOLONE ACETONIDE 40 MG/ML
40 INJECTION, SUSPENSION INTRA-ARTICULAR; INTRAMUSCULAR
Status: DISCONTINUED | OUTPATIENT
Start: 2019-07-23 | End: 2020-06-15

## 2019-07-23 NOTE — PROGRESS NOTES
Subjective:       Patient ID:  Milton Berkowitz is a 24 y.o. male who presents for   Chief Complaint   Patient presents with    Keloid     Keloid  - Follow-up  Symptom course: improving (last seen 6/28/19)  Currently using: s/p ILK 40mg/cc.  Affected locations: left ear and right ear  Signs / symptoms: asymptomatic        Review of Systems   Constitutional: Negative for fever.   Skin: Positive for tendency to form keloidal scars. Negative for sensitivity to antibiotic ointment and sensitivity to bandage adhesive.   Hematologic/Lymphatic: Does not bruise/bleed easily.        Objective:    Physical Exam   Constitutional: He appears well-developed and well-nourished. No distress.   Neurological: He is alert and oriented to person, place, and time. He is not disoriented.   Psychiatric: He has a normal mood and affect.   Skin:   Areas Examined (abnormalities noted in diagram):   Head / Face Inspection Performed              Diagram Legend     Erythematous scaling macule/papule c/w actinic keratosis       Vascular papule c/w angioma      Pigmented verrucoid papule/plaque c/w seborrheic keratosis      Yellow umbilicated papule c/w sebaceous hyperplasia      Irregularly shaped tan macule c/w lentigo     1-2 mm smooth white papules consistent with Milia      Movable subcutaneous cyst with punctum c/w epidermal inclusion cyst      Subcutaneous movable cyst c/w pilar cyst      Firm pink to brown papule c/w dermatofibroma      Pedunculated fleshy papule(s) c/w skin tag(s)      Evenly pigmented macule c/w junctional nevus     Mildly variegated pigmented, slightly irregular-bordered macule c/w mildly atypical nevus      Flesh colored to evenly pigmented papule c/w intradermal nevus       Pink pearly papule/plaque c/w basal cell carcinoma      Erythematous hyperkeratotic cursted plaque c/w SCC      Surgical scar with no sign of skin cancer recurrence      Open and closed comedones      Inflammatory papules and pustules       Verrucoid papule consistent consistent with wart     Erythematous eczematous patches and plaques     Dystrophic onycholytic nail with subungual debris c/w onychomycosis     Umbilicated papule    Erythematous-base heme-crusted tan verrucoid plaque consistent with inflamed seborrheic keratosis     Erythematous Silvery Scaling Plaque c/w Psoriasis     See annotation    Assessment / Plan:      Keloid  -     triamcinolone acetonide injection 40 mg    Intralesional Kenalog 40mg/cc (0.3 cc total) injected into 2 lesions on the posterior ear lobules today after obtaining verbal consent including risk of surrounding hypopigmentation. Patient tolerated procedure well.    Units:1  NDC for Kenalog 40mg/cc:  5224-0077-99         Follow up if symptoms worsen or fail to improve. Pt is going back to school in [place]..

## 2019-07-24 ENCOUNTER — LAB VISIT (OUTPATIENT)
Dept: LAB | Facility: HOSPITAL | Age: 24
End: 2019-07-24
Attending: INTERNAL MEDICINE
Payer: COMMERCIAL

## 2019-07-24 ENCOUNTER — TELEPHONE (OUTPATIENT)
Dept: PHARMACY | Facility: CLINIC | Age: 24
End: 2019-07-24

## 2019-07-24 ENCOUNTER — TELEPHONE (OUTPATIENT)
Dept: ENDOCRINOLOGY | Facility: CLINIC | Age: 24
End: 2019-07-24

## 2019-07-24 DIAGNOSIS — E03.8 CENTRAL HYPOTHYROIDISM: ICD-10-CM

## 2019-07-24 LAB — T4 FREE SERPL-MCNC: 0.72 NG/DL (ref 0.71–1.51)

## 2019-07-24 PROCEDURE — 36415 COLL VENOUS BLD VENIPUNCTURE: CPT

## 2019-07-24 PROCEDURE — 84439 ASSAY OF FREE THYROXINE: CPT

## 2019-07-24 NOTE — TELEPHONE ENCOUNTER
----- Message from Renea Romo MD sent at 7/24/2019  1:17 PM CDT -----  Hi Max    Please review     Ok to add him for a visit with you and me  Need to change IM trt to 50 mg monthly  Needs to take GH to achieve max height as his growth plates are not fused  Please stress importance to him

## 2019-08-12 ENCOUNTER — TELEPHONE (OUTPATIENT)
Dept: PHARMACY | Facility: CLINIC | Age: 24
End: 2019-08-12

## 2019-08-23 ENCOUNTER — PATIENT MESSAGE (OUTPATIENT)
Dept: ADMINISTRATIVE | Facility: OTHER | Age: 24
End: 2019-08-23

## 2019-08-29 DIAGNOSIS — E23.0 GHD (GROWTH HORMONE DEFICIENCY): ICD-10-CM

## 2019-08-29 NOTE — TELEPHONE ENCOUNTER
Financial Assistance for Norditropin approved from 8/1/19 to 7/31/20  Source [employer]  BIN: [id number]  PCN: [id number]  Id: [id number]  GRP: [id number]

## 2019-09-03 ENCOUNTER — TELEPHONE (OUTPATIENT)
Dept: ENDOCRINOLOGY | Facility: CLINIC | Age: 24
End: 2019-09-03

## 2019-09-03 NOTE — TELEPHONE ENCOUNTER
Pt missed follow up appointment for Pan-hypopituitarism on 8/1/19.      Pt recently ran out of somatropin 1.5mg daily.  Prescription was refilled by Dr. Edward on 8/29/19.      Pt needs follow for increasing doses of Somatropin as growth plates have not fused yet and will benefit from higher doses to stimulate linear growth.  Pt also needs follow up for rest of hormonal deficiencies associated to his pan hypopituitarism.       Call attempt to establish follow up was unsuccessful.      Forwarded mess

## 2019-09-05 ENCOUNTER — TELEPHONE (OUTPATIENT)
Dept: PHARMACY | Facility: CLINIC | Age: 24
End: 2019-09-05

## 2019-09-05 ENCOUNTER — PATIENT MESSAGE (OUTPATIENT)
Dept: ENDOCRINOLOGY | Facility: CLINIC | Age: 24
End: 2019-09-05

## 2019-09-05 NOTE — TELEPHONE ENCOUNTER
CONRADO Bishop from [facility], stated Norditropin refills were not authorized because patient missed last appointment on 8/1/19.  Refills will be authorized at appointment.  An appointment for patient on 9/12/19 at 1pm.  Spoke to mom to let her know that patient needs to go into appointment before refills can be authorized.  Patient is currently at school in [place].  Mom will rescheduled appointment if needed.  Mom verbalized understanding.

## 2019-09-05 NOTE — TELEPHONE ENCOUNTER
Mom informed of Norditropin refill was denied, no reason noted.  Patient has seen provider recently on 6/25/19 but missed 8/1/19 appointment.  She would like [facility] to reach out to [facility] to see why refills was denied.  Provider messaged.

## 2019-09-05 NOTE — TELEPHONE ENCOUNTER
Beatrice informed refill was denied as patient no showed his visit with Dr Camron Farley.  We have rescheduled his visit for next Thursday 9/12/19 for 1:00PM and sent a patient portal message to patient informing him of this.

## 2019-09-06 ENCOUNTER — PATIENT MESSAGE (OUTPATIENT)
Dept: ENDOCRINOLOGY | Facility: CLINIC | Age: 24
End: 2019-09-06

## 2019-09-06 ENCOUNTER — TELEPHONE (OUTPATIENT)
Dept: ENDOCRINOLOGY | Facility: CLINIC | Age: 24
End: 2019-09-06

## 2019-09-06 DIAGNOSIS — E34.30 SHORT STATURE DUE TO ENDOCRINE DISORDER: ICD-10-CM

## 2019-09-06 DIAGNOSIS — E23.0 HGHG (HYPOGONADOTROPIC HYPOGONADISM): ICD-10-CM

## 2019-09-06 DIAGNOSIS — E23.0 PANHYPOPITUITARISM: Primary | ICD-10-CM

## 2019-09-06 NOTE — TELEPHONE ENCOUNTER
Contacted Dr. Miranda from [facility] in regards to recommended doses of Norditropin to stimulate linear growth as patient's growth plates have not fused yet.      Dr. Miranda recommend to increase Norditropin to 2.5mg Daily that will be aroun 0.27mg/kg/week      In regards to Testosterone current dose of 50mg is an appropriate dose to treat his hypogonadism without risk of growth plate fusion.     Norditropin prescription sent to [facility].

## 2019-09-06 NOTE — TELEPHONE ENCOUNTER
Needs to be on it but at a higher dose  Dr Farley will reach out to Dr Miranda and get her recs as to dosage and target igf 1 levels

## 2019-09-10 ENCOUNTER — TELEPHONE (OUTPATIENT)
Dept: ENDOCRINOLOGY | Facility: CLINIC | Age: 24
End: 2019-09-10

## 2019-09-10 NOTE — TELEPHONE ENCOUNTER
New Rx for Norditropin 10mg/1.5ml PENS received (2.5mg SQ QPM - 1 pen = 4d/s, 7 pens = 28 d/s). This is a dosage increase to stimulate linear growth as patient's growth plates have not fused yet, this will give patient about 0.27mg/kg/week - appropriate.  Mom informed of dosage increase. Patient has been out of medication b/c [facility] denied refilled prior to increased dose. She was a little confused why there was a dosage increased since refills were declined - explained MD notes above. She verbalized understanding. Patient is back at school in [place]. Norditropin to be shipped out on 9/10/19 to arrive at mom's address in [place] on 9/11/19 via [employer], so that she can ship to patient in [place] before the end of the week. $0 copay (28 d/s). Box of alcohol wipes, pen needles and Sharps requested. All questions answered to mom's satisfaction. [facility] will continue to reach out to mom monthly for refills. She is advised to call [facility] if any questions arise. JON

## 2019-10-03 ENCOUNTER — TELEPHONE (OUTPATIENT)
Dept: PHARMACY | Facility: CLINIC | Age: 24
End: 2019-10-03

## 2019-10-23 DIAGNOSIS — J45.909 ASTHMA, UNSPECIFIED ASTHMA SEVERITY, UNSPECIFIED WHETHER COMPLICATED, UNSPECIFIED WHETHER PERSISTENT: ICD-10-CM

## 2019-10-23 RX ORDER — ALBUTEROL SULFATE 90 UG/1
1-2 AEROSOL, METERED RESPIRATORY (INHALATION)
Qty: 18 INHALER | Refills: 3 | Status: SHIPPED | OUTPATIENT
Start: 2019-10-23 | End: 2020-06-22 | Stop reason: SDUPTHER

## 2019-10-29 ENCOUNTER — TELEPHONE (OUTPATIENT)
Dept: PHARMACY | Facility: CLINIC | Age: 24
End: 2019-10-29

## 2019-11-18 NOTE — TELEPHONE ENCOUNTER
Norditropin refill. Confirmed two patient identifiers - name + . Patient initially called and grants [facility] consent to deliver Norditropin to his Mom. He goes to school in [place] and will be home for [facility]. [facility] called mom to confirm shipment. Patient confirms dosage (2.5mg SQ Qd). Patient was unsure of number of doses remaining. [facility] to ship out Norditropin on 19 to arrive at patients home on 19 via [employer]. Address confirmed (Notes to [employer]: none, come fine each month), $0 Copay, Supplies needed: ALL - pen needles, alcohol wipes, Sharps container. Patient denies starting any new medications, having any new diagnoses or allergies, side effects, or missing any doses. All questions answered to patients satisfaction. [facility] will continue to reach out to patient monthly for refills. TTFAB

## 2019-11-27 ENCOUNTER — LAB VISIT (OUTPATIENT)
Dept: LAB | Facility: HOSPITAL | Age: 24
End: 2019-11-27
Attending: INTERNAL MEDICINE
Payer: COMMERCIAL

## 2019-11-27 ENCOUNTER — OFFICE VISIT (OUTPATIENT)
Dept: ENDOCRINOLOGY | Facility: CLINIC | Age: 24
End: 2019-11-27
Payer: COMMERCIAL

## 2019-11-27 VITALS
WEIGHT: 142.94 LBS | HEIGHT: 63 IN | SYSTOLIC BLOOD PRESSURE: 118 MMHG | BODY MASS INDEX: 25.33 KG/M2 | HEART RATE: 74 BPM | DIASTOLIC BLOOD PRESSURE: 74 MMHG

## 2019-11-27 DIAGNOSIS — E23.0 HGHG (HYPOGONADOTROPIC HYPOGONADISM): ICD-10-CM

## 2019-11-27 DIAGNOSIS — E23.0 PANHYPOPITUITARISM: ICD-10-CM

## 2019-11-27 DIAGNOSIS — E27.49 SECONDARY ADRENAL INSUFFICIENCY: ICD-10-CM

## 2019-11-27 DIAGNOSIS — E03.8 CENTRAL HYPOTHYROIDISM: Primary | ICD-10-CM

## 2019-11-27 DIAGNOSIS — E23.0 GHD (GROWTH HORMONE DEFICIENCY): ICD-10-CM

## 2019-11-27 LAB
ALBUMIN SERPL BCP-MCNC: 4.4 G/DL (ref 3.5–5.2)
ALP SERPL-CCNC: 210 U/L (ref 55–135)
ALT SERPL W/O P-5'-P-CCNC: 7 U/L (ref 10–44)
ANION GAP SERPL CALC-SCNC: 6 MMOL/L (ref 8–16)
AST SERPL-CCNC: 19 U/L (ref 10–40)
BASOPHILS # BLD AUTO: 0.04 K/UL (ref 0–0.2)
BASOPHILS NFR BLD: 0.7 % (ref 0–1.9)
BILIRUB SERPL-MCNC: 0.7 MG/DL (ref 0.1–1)
BUN SERPL-MCNC: 12 MG/DL (ref 6–20)
CALCIUM SERPL-MCNC: 9.8 MG/DL (ref 8.7–10.5)
CHLORIDE SERPL-SCNC: 102 MMOL/L (ref 95–110)
CO2 SERPL-SCNC: 29 MMOL/L (ref 23–29)
CREAT SERPL-MCNC: 0.8 MG/DL (ref 0.5–1.4)
DIFFERENTIAL METHOD: ABNORMAL
EOSINOPHIL # BLD AUTO: 0.3 K/UL (ref 0–0.5)
EOSINOPHIL NFR BLD: 5.5 % (ref 0–8)
ERYTHROCYTE [DISTWIDTH] IN BLOOD BY AUTOMATED COUNT: 13 % (ref 11.5–14.5)
EST. GFR  (AFRICAN AMERICAN): >60 ML/MIN/1.73 M^2
EST. GFR  (NON AFRICAN AMERICAN): >60 ML/MIN/1.73 M^2
GLUCOSE SERPL-MCNC: 83 MG/DL (ref 70–110)
HCT VFR BLD AUTO: 39.8 % (ref 40–54)
HGB BLD-MCNC: 12.4 G/DL (ref 14–18)
IMM GRANULOCYTES # BLD AUTO: 0.01 K/UL (ref 0–0.04)
IMM GRANULOCYTES NFR BLD AUTO: 0.2 % (ref 0–0.5)
LYMPHOCYTES # BLD AUTO: 2.1 K/UL (ref 1–4.8)
LYMPHOCYTES NFR BLD: 35.4 % (ref 18–48)
MCH RBC QN AUTO: 29 PG (ref 27–31)
MCHC RBC AUTO-ENTMCNC: 31.2 G/DL (ref 32–36)
MCV RBC AUTO: 93 FL (ref 82–98)
MONOCYTES # BLD AUTO: 0.4 K/UL (ref 0.3–1)
MONOCYTES NFR BLD: 7.2 % (ref 4–15)
NEUTROPHILS # BLD AUTO: 3 K/UL (ref 1.8–7.7)
NEUTROPHILS NFR BLD: 51 % (ref 38–73)
NRBC BLD-RTO: 0 /100 WBC
PLATELET # BLD AUTO: 244 K/UL (ref 150–350)
PMV BLD AUTO: 10.5 FL (ref 9.2–12.9)
POTASSIUM SERPL-SCNC: 4.5 MMOL/L (ref 3.5–5.1)
PROT SERPL-MCNC: 7.6 G/DL (ref 6–8.4)
RBC # BLD AUTO: 4.28 M/UL (ref 4.6–6.2)
SODIUM SERPL-SCNC: 137 MMOL/L (ref 136–145)
T4 FREE SERPL-MCNC: 0.62 NG/DL (ref 0.71–1.51)
TESTOST SERPL-MCNC: 339 NG/DL (ref 304–1227)
WBC # BLD AUTO: 5.84 K/UL (ref 3.9–12.7)

## 2019-11-27 PROCEDURE — 99214 OFFICE O/P EST MOD 30 MIN: CPT | Mod: S$GLB,,, | Performed by: INTERNAL MEDICINE

## 2019-11-27 PROCEDURE — 84403 ASSAY OF TOTAL TESTOSTERONE: CPT

## 2019-11-27 PROCEDURE — 36415 COLL VENOUS BLD VENIPUNCTURE: CPT

## 2019-11-27 PROCEDURE — 99214 PR OFFICE/OUTPT VISIT, EST, LEVL IV, 30-39 MIN: ICD-10-PCS | Mod: S$GLB,,, | Performed by: INTERNAL MEDICINE

## 2019-11-27 PROCEDURE — 99999 PR PBB SHADOW E&M-EST. PATIENT-LVL III: ICD-10-PCS | Mod: PBBFAC,,, | Performed by: INTERNAL MEDICINE

## 2019-11-27 PROCEDURE — 3008F BODY MASS INDEX DOCD: CPT | Mod: CPTII,S$GLB,, | Performed by: INTERNAL MEDICINE

## 2019-11-27 PROCEDURE — 80053 COMPREHEN METABOLIC PANEL: CPT

## 2019-11-27 PROCEDURE — 84439 ASSAY OF FREE THYROXINE: CPT

## 2019-11-27 PROCEDURE — 85025 COMPLETE CBC W/AUTO DIFF WBC: CPT

## 2019-11-27 PROCEDURE — 84305 ASSAY OF SOMATOMEDIN: CPT

## 2019-11-27 PROCEDURE — 99999 PR PBB SHADOW E&M-EST. PATIENT-LVL III: CPT | Mod: PBBFAC,,, | Performed by: INTERNAL MEDICINE

## 2019-11-27 PROCEDURE — 3008F PR BODY MASS INDEX (BMI) DOCUMENTED: ICD-10-PCS | Mod: CPTII,S$GLB,, | Performed by: INTERNAL MEDICINE

## 2019-11-27 RX ORDER — TESTOSTERONE 20.25 MG/1.25G
1 GEL TOPICAL DAILY
Qty: 30 PACKET | Refills: 5 | Status: SHIPPED | OUTPATIENT
Start: 2019-11-27 | End: 2020-01-22 | Stop reason: SDUPTHER

## 2019-11-27 RX ORDER — HYDROCORTISONE 5 MG/1
TABLET ORAL
Qty: 100 TABLET | Refills: 11 | Status: SHIPPED | OUTPATIENT
Start: 2019-11-27 | End: 2021-05-04 | Stop reason: SDUPTHER

## 2019-11-27 NOTE — ASSESSMENT & PLAN NOTE
Secondary hypothyroidism - clinically and biochemically euthyroid  Follow ft4 and adjust accordingly  Avoid exogenous hyperthyroidism as this can accelerate bone loss

## 2019-11-27 NOTE — PROGRESS NOTES
"Subjective:      Patient ID: Milton Berkowitz is a 24 y.o. male.    Chief Complaint:  panhypopituitarism      History of Present Illness  Mr. Berkowitz presents for follow up of panhypopituitarism. Last visit in 6/2019 with Dr. Romo.     He was diagnosed with panhypopituitarism at age 4 .     Established care here in 11/2017 with Dr. Kerr.      MRI 7/24/17  FINDINGS:  Sella: Normal posterior pituitary bright spot is absent.  Additionally, the infundibulum is not identifiable.  A 4 mm rounded focus of T1 hyperintense signal is present at the level of the median eminence of the hypothalamus suggesting ectopic location of the neurohypophysis.  Pituitary tissue within the sella appears otherwise essentially normal, though at the lower limit of normal size for patient's age     Mom : 5'5"   Dad:  5"6"     Current regimen:   Hydrocortisone 5mg tablets - takes 10 mg every morning and 5 mg every evening   Levothyroxine 88 mcg   Norditropin 2.5 mg daily   Androgel 1.25 g daily     Patient reports taking all medications as prescribed    He feels as though he has grown a few inches in the past year  Normal erection, gets early Am spontaneous erections daily  Reports normal libido on testosterone  Would like more virilization     Takes thyroid hormone every day and hasn't missed doses.   No overt fatigue. Normal BM's. Has some dry skin.  No tremor or palpitations.      No polyuria or polydipsia.      No history of fracture   bmd 7/6/18     Impression         Bone density lower than expected for age.   Please note that peak bone mass may not be achieved at the spine yet, given pt's biologic age.        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   Gastrointestinal: Negative for nausea and vomiting.       Objective:   Physical Exam   Nursing note and vitals reviewed.    BP Readings from Last 3 Encounters:   11/27/19 118/74 " "  06/25/19 106/70   06/12/19 108/80     Wt Readings from Last 1 Encounters:   11/27/19 1017 64.8 kg (142 lb 15.5 oz)       Body mass index is 25.33 kg/m².    Lab Review:   No results found for: HGBA1C  No results found for: CHOL, HDL, LDLCALC, TRIG, CHOLHDL  Lab Results   Component Value Date     11/27/2019    K 4.5 11/27/2019     11/27/2019    CO2 29 11/27/2019    GLU 83 11/27/2019    BUN 12 11/27/2019    CREATININE 0.8 11/27/2019    CALCIUM 9.8 11/27/2019    PROT 7.6 11/27/2019    ALBUMIN 4.4 11/27/2019    BILITOT 0.7 11/27/2019    ALKPHOS 210 (H) 11/27/2019    AST 19 11/27/2019    ALT 7 (L) 11/27/2019    ANIONGAP 6 (L) 11/27/2019    ESTGFRAFRICA >60.0 11/27/2019    EGFRNONAA >60.0 11/27/2019    TSH 2.748 06/25/2019         Assessment and Plan     Panhypopituitarism  --With deficiencies in tesosterone, thyroid hormone, growth hormone and ACTH/cortisol        Central hypothyroidism  Secondary hypothyroidism - clinically and biochemically euthyroid  Follow ft4 and adjust accordingly  Avoid exogenous hyperthyroidism as this can accelerate bone loss               GHD (growth hormone deficiency)  --Bone age in 7/2019 age 16  --Determined that growth plates have not fused so growth hormone increased to 2.5 mg daily to see if we can stimulate some linear growth  --He does feel as though he is taller in the past year  --Measured height today with shoes off was 5'3" (this will be his baseline height going forward)  --Mid parental height 5'8"  --Will aim for IGF1 level that is 1-2 SD above mean      HGHG (hypogonadotropic hypogonadism)  Secondary hypogonadism- on Testosterone replacement therapy. Will continue lower dose of testosterone for now until growth plates fused. Continue Androgel 1.25 mg once daily. He is having normal libido and erections on current testosterone dose.            Secondary adrenal insufficiency  Secondary adrenal insufficiency-   Continue  Hydrocortisone 10/5 mg  Reviewed times of " increased dose - sick day precautions handout provided and reviewed  Has medic alert tag          Richard Brody M.D. Staff Endocrinology

## 2019-11-27 NOTE — ASSESSMENT & PLAN NOTE
Secondary hypogonadism- on Testosterone replacement therapy. Will continue lower dose of testosterone for now until growth plates fused. Continue Androgel 1.25 mg once daily. He is having normal libido and erections on current testosterone dose.

## 2019-11-27 NOTE — ASSESSMENT & PLAN NOTE
Secondary adrenal insufficiency-   Continue  Hydrocortisone 10/5 mg  Reviewed times of increased dose - sick day precautions handout provided and reviewed  Has medic alert tag

## 2019-11-27 NOTE — ASSESSMENT & PLAN NOTE
"--Bone age in 7/2019 age 16  --Determined that growth plates have not fused so growth hormone increased to 2.5 mg daily to see if we can stimulate some linear growth  --He does feel as though he is taller in the past year  --Measured height today with shoes off was 5'3" (this will be his baseline height going forward)  --Mid parental height 5'8"  --Will aim for IGF1 level that is 1-2 SD above mean    "

## 2019-11-29 ENCOUNTER — TELEPHONE (OUTPATIENT)
Dept: ENDOCRINOLOGY | Facility: CLINIC | Age: 24
End: 2019-11-29

## 2019-11-29 DIAGNOSIS — E03.8 CENTRAL HYPOTHYROIDISM: Primary | ICD-10-CM

## 2019-11-29 RX ORDER — LEVOTHYROXINE SODIUM 100 UG/1
100 TABLET ORAL DAILY
Qty: 30 TABLET | Refills: 11 | Status: SHIPPED | OUTPATIENT
Start: 2019-11-29 | End: 2020-06-23

## 2019-11-29 NOTE — TELEPHONE ENCOUNTER
I have spoke to patient's mother and let them know that Dr Brody has reviewed his labs. His testosterone level is in a good range. His thyroid hormone level is low so Dr Brody recommend we increase the thyroid hormone to 100 mcg once daily and repeat the thyroid level in 6 weeks. I'll send a new prescription in. Dr Brody is waiting for the growth hormone level to result.

## 2019-11-29 NOTE — TELEPHONE ENCOUNTER
Please advise patient that I reviewed his labs. His testosterone level is in a good range. His thyroid hormone level is low so I recommend we increase the thyroid hormone to 100 mcg once daily and repeat the thyroid level in 6 weeks. I'll send a new prescription in. I'm waiting for the growth hormone level to result.

## 2019-12-01 LAB
IGF-I SERPL-MCNC: 39 NG/ML (ref 91–442)
IGF-I Z-SCORE SERPL: -2.33 SD

## 2019-12-18 ENCOUNTER — TELEPHONE (OUTPATIENT)
Dept: PHARMACY | Facility: CLINIC | Age: 24
End: 2019-12-18

## 2019-12-18 NOTE — TELEPHONE ENCOUNTER
RX call attempt 1 regarding Nordotropin from [facility]. Patient did not answer -- left a voicemail and spoke to mother who said she would let the patient know we were trying to reach him. Patient portal is pending.  copay 0.00  jlb training

## 2020-01-22 ENCOUNTER — TELEPHONE (OUTPATIENT)
Dept: ENDOCRINOLOGY | Facility: CLINIC | Age: 25
End: 2020-01-22

## 2020-01-22 DIAGNOSIS — E23.0 HGHG (HYPOGONADOTROPIC HYPOGONADISM): ICD-10-CM

## 2020-01-22 DIAGNOSIS — E34.30 SHORT STATURE DUE TO ENDOCRINE DISORDER: ICD-10-CM

## 2020-01-22 DIAGNOSIS — E23.0 PANHYPOPITUITARISM: Primary | ICD-10-CM

## 2020-01-22 DIAGNOSIS — E03.8 CENTRAL HYPOTHYROIDISM: ICD-10-CM

## 2020-01-22 RX ORDER — TESTOSTERONE 20.25 MG/1.25G
1 GEL TOPICAL DAILY
Qty: 30 PACKET | Refills: 5 | Status: SHIPPED | OUTPATIENT
Start: 2020-01-22 | End: 2020-06-15 | Stop reason: SDUPTHER

## 2020-01-22 NOTE — TELEPHONE ENCOUNTER
"Called patient for follow up on Norditropin and Testosterone dosing and compliance after case discussion with Peds endo today.      Per discussion patient will benefit from higher doses of Norditropin around 0.27mg/kg/week which patient is currently on (2.5mg Daily) To obtain parental mid parental height of 5'8" .  Pt is currently 5'3" which would represent a 5 inch linear growth.       Patients states has been compliant with Norditropin 2.5mg daily  And has not missed any doses.  Stressed compliance with Norditropin and patient expressed understanding.         Pt needs refill of T.      Plan:   -C/w Norditropin 2.5mg daily   -C/w Testosterone GEL 1.62% 1pck daily   -Repeat bone age with IGF-1 and T levels on June prior to next visit   -FT4 and Renal panel prior to next visit       "

## 2020-01-28 ENCOUNTER — TELEPHONE (OUTPATIENT)
Dept: PHARMACY | Facility: CLINIC | Age: 25
End: 2020-01-28

## 2020-01-28 NOTE — TELEPHONE ENCOUNTER
Rx call for Norditropin refill attempt 1 called pt cell which is mobile number listed in epic didn't get an answer left VM, called home number listed in epic and that's pt mothers number. Pt mother stated he was away at school so she wasn't to sure about his medication and how many doses he had on hand at this time. She stated when she speak with him  she'll let him know we called so he can give us a call back. copay 0.00 @004

## 2020-02-03 NOTE — TELEPHONE ENCOUNTER
Rx call for Norditropin refill pt reached shipping  with  arrival with pt consent copay 0.00 @004. Added pen needles to order per pt request, next scheduled refill dose is 2/10. Address and  confirmed. Patient has about 5-6 days of medication on hand at this time. Patient has not started any new medications, has had no missed doses and no side effects present. Patient is currently taking the medication as directed by doctors instruction Inject 2.5mg into skin daily in the evening. Patient does have a safe place in their residence to keep medication at desired temperature away from small children and pets. Patient also does have the capability of contacting 911 in the event of an emergency. Patient states they do not have any questions or concerns at this time.

## 2020-02-04 ENCOUNTER — TELEPHONE (OUTPATIENT)
Dept: ENDOCRINOLOGY | Facility: CLINIC | Age: 25
End: 2020-02-04

## 2020-02-04 DIAGNOSIS — E23.0 HGHG (HYPOGONADOTROPIC HYPOGONADISM): ICD-10-CM

## 2020-02-04 NOTE — TELEPHONE ENCOUNTER
----- Message from Eulalia Armando PharmD sent at 2/4/2020  2:08 PM CST -----  Contact: Adrianna Berkowitz came in to our pharmacy today looking for a prescription for Androgel.  I see that a prescription was printed on 1/22/2020 but the patient did not receive a hard copy.  Can someone please call this into the pharmacy as a phone order.

## 2020-03-02 ENCOUNTER — TELEPHONE (OUTPATIENT)
Dept: PHARMACY | Facility: CLINIC | Age: 25
End: 2020-03-02

## 2020-03-16 NOTE — TELEPHONE ENCOUNTER
Rx call for Norditropin refill pt reached shipping 3/19 with 3/20 arrival with pt consent, copay 0.00 @04. No supplies is needed at this time. Address and  confirmed. Patient has 0 doses on hand at this time. Patient has not started any new medications, has had no missed doses and no side effects present. Patient is currently taking the medication as directed by doctors instruction Inject 2.5mg into skin daily in the evening. Patient does have a safe place in their residence to keep medication at desired temperature away from small children and pets. Patient also does have the capability of contacting 911 in the event of an emergency. Patient states they do not have any questions or concerns at this time.

## 2020-03-23 ENCOUNTER — TELEPHONE (OUTPATIENT)
Dept: PHARMACY | Facility: CLINIC | Age: 25
End: 2020-03-23

## 2020-03-23 NOTE — TELEPHONE ENCOUNTER
Norditropin follow up. Confirmed two patient identifiers - name + . Goals of treatment reviewed - no changes, doing well. Labs reviewed - scheduled IFG-1, testosterone, renal panel, FT4, and bone age for 2020. Treatment continuation appropriate. Medication Reconciliation completed - no changes, no DDIs. Patient denies any side effects, visits to the ER/urgent care and missed days from school, work, or planned activities due to medical condition. Patient denies any missed doses and confirms proper administration (2.5mg SQ QD, further injection training declined) and storage (refrigerated). She admits he uses his inhaler a little more often, but that's not uncommon this season due to the pollen. Comorbidities and contraindications reviewed - no changes. Patient denies any recent changes to allergies, health conditions, or insurance. All questions answered to patients satisfaction. [facility] to continue to follow up with patient in 6 months and monthly for refills. TTN

## 2020-04-14 DIAGNOSIS — E34.30 SHORT STATURE DUE TO ENDOCRINE DISORDER: ICD-10-CM

## 2020-04-14 DIAGNOSIS — E23.0 PANHYPOPITUITARISM: ICD-10-CM

## 2020-04-15 ENCOUNTER — TELEPHONE (OUTPATIENT)
Dept: PHARMACY | Facility: CLINIC | Age: 25
End: 2020-04-15

## 2020-05-21 ENCOUNTER — TELEPHONE (OUTPATIENT)
Dept: PHARMACY | Facility: CLINIC | Age: 25
End: 2020-05-21

## 2020-05-21 NOTE — TELEPHONE ENCOUNTER
Refill call regarding Norditropin at [facility]. Will prepare for shipment with consent of patient's mom on  to arrive . Copay 0.00. Patient has not started any new medications including OTC drugs. Patient has not had any medication/ dose or instruction changes. No new allergies or side effects reported with this shipment. Medication is being taken as prescribed by physician and properly stored. Two patient identifiers:  and Address verified. Patient has no questions or concerns for [facility]. 7 days on hand with swabs and pen needles added.

## 2020-06-15 ENCOUNTER — HOSPITAL ENCOUNTER (OUTPATIENT)
Dept: RADIOLOGY | Facility: HOSPITAL | Age: 25
Discharge: HOME | End: 2020-06-15
Attending: INTERNAL MEDICINE
Payer: COMMERCIAL

## 2020-06-15 ENCOUNTER — OFFICE VISIT (OUTPATIENT)
Dept: ENDOCRINOLOGY | Facility: CLINIC | Age: 25
End: 2020-06-15
Payer: COMMERCIAL

## 2020-06-15 VITALS
BODY MASS INDEX: 25.53 KG/M2 | DIASTOLIC BLOOD PRESSURE: 80 MMHG | HEART RATE: 72 BPM | HEIGHT: 65 IN | SYSTOLIC BLOOD PRESSURE: 110 MMHG | WEIGHT: 153.25 LBS

## 2020-06-15 DIAGNOSIS — E27.49 SECONDARY ADRENAL INSUFFICIENCY: ICD-10-CM

## 2020-06-15 DIAGNOSIS — E23.0 GHD (GROWTH HORMONE DEFICIENCY): ICD-10-CM

## 2020-06-15 DIAGNOSIS — E03.8 CENTRAL HYPOTHYROIDISM: ICD-10-CM

## 2020-06-15 DIAGNOSIS — E23.0 PANHYPOPITUITARISM: Primary | ICD-10-CM

## 2020-06-15 DIAGNOSIS — E23.0 PANHYPOPITUITARISM: ICD-10-CM

## 2020-06-15 PROBLEM — E34.30 SHORT STATURE DUE TO ENDOCRINE DISORDER: Status: RESOLVED | Noted: 2017-07-16 | Resolved: 2020-06-15

## 2020-06-15 PROCEDURE — 77072 XR BONE AGE STUDY: ICD-10-PCS | Mod: 26,,, | Performed by: RADIOLOGY

## 2020-06-15 PROCEDURE — 77072 BONE AGE STUDIES: CPT | Mod: TC

## 2020-06-15 PROCEDURE — 99214 PR OFFICE/OUTPT VISIT, EST, LEVL IV, 30-39 MIN: ICD-10-PCS | Mod: S$GLB,,, | Performed by: INTERNAL MEDICINE

## 2020-06-15 PROCEDURE — 3008F BODY MASS INDEX DOCD: CPT | Mod: CPTII,S$GLB,, | Performed by: INTERNAL MEDICINE

## 2020-06-15 PROCEDURE — 99214 OFFICE O/P EST MOD 30 MIN: CPT | Mod: S$GLB,,, | Performed by: INTERNAL MEDICINE

## 2020-06-15 PROCEDURE — 77072 BONE AGE STUDIES: CPT | Mod: 26,,, | Performed by: RADIOLOGY

## 2020-06-15 PROCEDURE — 99999 PR PBB SHADOW E&M-EST. PATIENT-LVL III: CPT | Mod: PBBFAC,,, | Performed by: INTERNAL MEDICINE

## 2020-06-15 PROCEDURE — 3008F PR BODY MASS INDEX (BMI) DOCUMENTED: ICD-10-PCS | Mod: CPTII,S$GLB,, | Performed by: INTERNAL MEDICINE

## 2020-06-15 PROCEDURE — 99999 PR PBB SHADOW E&M-EST. PATIENT-LVL III: ICD-10-PCS | Mod: PBBFAC,,, | Performed by: INTERNAL MEDICINE

## 2020-06-15 NOTE — ASSESSMENT & PLAN NOTE
Panhypopituitarism-   Secondary adrenal insufficiency-   Continue  Hydrocortisone   Reviewed times of increased dose - sick day precautions handout provided and reviewed  Has medical alert tag  Follow symptoms and labs      Secondary hypothyroidism - clinically and biochemically euthyroid  Follow ft4 and adjust accordingly  Avoid exogenous hyperthyroidism as this can accelerate bone loss      Secondary hypogonadism- on Testosterone replacement therapy. Plan to maximize dose for puberty but will ensure growth plates are fused . Plan on rechecking labs        Secondary AGHD - reviewed risks and benefits of therapy   Continue until growth plates fused   Discussed QOL issues      No evidence of DI-  follow Na+ levels

## 2020-06-15 NOTE — PROGRESS NOTES
"Subjective:      Patient ID: Milton Berkowitz is a 25 y.o. male.    Chief Complaint:  panhypopituitarism    History of Present Illness  Mr. Berkowitz presents for follow up of panhypopituitarism.     He was diagnosed with panhypopituitarism at age 4 .     Established care here in 11/2017 with Dr. Kerr.      MRI 7/24/17  FINDINGS:  Sella: Normal posterior pituitary bright spot is absent.  Additionally, the infundibulum is not identifiable.  A 4 mm rounded focus of T1 hyperintense signal is present at the level of the median eminence of the hypothalamus suggesting ectopic location of the neurohypophysis.  Pituitary tissue within the sella appears otherwise essentially normal, though at the lower limit of normal size for patient's age     Mom : 5'5"   Dad:  5"6"     Current regimen:   Hydrocortisone 5mg tablets - takes 10 mg every morning and 5 mg every evening   Levothyroxine 88 mcg   Norditropin 2.5 mg daily   Androgel 1.62%   daily       Is wearing his medic alert tag - aware of sick day precautions   Patient reports taking all medications as prescribed    He feels as though he has grown a few inches in the past year  Normal erection, gets early Am spontaneous erections daily  Reports normal libido on testosterone  +voice deepening and more body hair       Takes thyroid hormone every day and hasn't missed doses.   No overt fatigue. Normal BM's.    No tremor or palpitations.      No polyuria or polydipsia.      No history of fracture   bmd 7/6/18     Impression         Bone density lower than expected for age.   Please note that peak bone mass may not be achieved at the spine yet, given pt's biologic age.        In [place] 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   Eyes: Negative for visual disturbance.   Respiratory: Negative for shortness of breath.    Cardiovascular: Negative for chest pain.   Gastrointestinal: " Negative for abdominal pain.   Musculoskeletal: Negative for myalgias.   Skin: Negative for wound.   Neurological: Negative for headaches.   Hematological: Does not bruise/bleed easily.   Psychiatric/Behavioral: Negative for sleep disturbance.       Objective:   Physical Exam  Vitals signs reviewed.   Neck:      Thyroid: No thyromegaly.   Cardiovascular:      Rate and Rhythm: Normal rate.   Pulmonary:      Effort: Pulmonary effort is normal.   Abdominal:      Palpations: Abdomen is soft.       BP Readings from Last 3 Encounters:   06/15/20 110/80   11/27/19 118/74   06/25/19 106/70     Wt Readings from Last 1 Encounters:   06/15/20 1011 69.5 kg (153 lb 3.5 oz)       Body mass index is 25.89 kg/m².    Lab Review:   No results found for: HGBA1C  No results found for: CHOL, HDL, LDLCALC, TRIG, CHOLHDL  Lab Results   Component Value Date     11/27/2019    K 4.5 11/27/2019     11/27/2019    CO2 29 11/27/2019    GLU 83 11/27/2019    BUN 12 11/27/2019    CREATININE 0.8 11/27/2019    CALCIUM 9.8 11/27/2019    PROT 7.6 11/27/2019    ALBUMIN 4.4 11/27/2019    BILITOT 0.7 11/27/2019    ALKPHOS 210 (H) 11/27/2019    AST 19 11/27/2019    ALT 7 (L) 11/27/2019    ANIONGAP 6 (L) 11/27/2019    ESTGFRAFRICA >60.0 11/27/2019    EGFRNONAA >60.0 11/27/2019    TSH 2.748 06/25/2019         Assessment and Plan     Panhypopituitarism  Panhypopituitarism-   Secondary adrenal insufficiency-   Continue  Hydrocortisone   Reviewed times of increased dose - sick day precautions handout provided and reviewed  Has medical alert tag  Follow symptoms and labs      Secondary hypothyroidism - clinically and biochemically euthyroid  Follow ft4 and adjust accordingly  Avoid exogenous hyperthyroidism as this can accelerate bone loss      Secondary hypogonadism- on Testosterone replacement therapy. Plan to maximize dose for puberty but will ensure growth plates are fused . Plan on rechecking labs        Secondary AGHD - reviewed risks and  benefits of therapy   Continue until growth plates fused   Discussed QOL issues      No evidence of DI-  follow Na+ levels        labs and bone age today  rtc 6 months

## 2020-06-17 ENCOUNTER — TELEPHONE (OUTPATIENT)
Dept: PHARMACY | Facility: CLINIC | Age: 25
End: 2020-06-17

## 2020-06-17 NOTE — TELEPHONE ENCOUNTER
Refill call regarding Norditropin at [facility].  Spoke with patient's mom and with her consent will prepare for shipment on  to arrive .  Copay 0.00.  Mom states they have about 1 cartridge left.  No sharps or supplies needed.  Patient has not started any new medications including OTC drugs. Patient has not had any medication/ dose or instruction changes. No new allergies or side effects reported with this shipment. Medication is being taken as prescribed by physician and properly stored. Two patient identifiers:  and Address verified. Patient has no questions or concerns for [facility].

## 2020-06-22 ENCOUNTER — OFFICE VISIT (OUTPATIENT)
Dept: INTERNAL MEDICINE | Facility: CLINIC | Age: 25
End: 2020-06-22
Payer: COMMERCIAL

## 2020-06-22 VITALS
WEIGHT: 156.5 LBS | SYSTOLIC BLOOD PRESSURE: 112 MMHG | HEART RATE: 76 BPM | BODY MASS INDEX: 26.72 KG/M2 | DIASTOLIC BLOOD PRESSURE: 60 MMHG | HEIGHT: 64 IN

## 2020-06-22 DIAGNOSIS — R19.04 ABDOMINAL WALL MASS OF LEFT LOWER QUADRANT: Primary | ICD-10-CM

## 2020-06-22 DIAGNOSIS — J45.909 ASTHMA, UNSPECIFIED ASTHMA SEVERITY, UNSPECIFIED WHETHER COMPLICATED, UNSPECIFIED WHETHER PERSISTENT: ICD-10-CM

## 2020-06-22 PROCEDURE — 99213 PR OFFICE/OUTPT VISIT, EST, LEVL III, 20-29 MIN: ICD-10-PCS | Mod: S$GLB,,, | Performed by: PHYSICIAN ASSISTANT

## 2020-06-22 PROCEDURE — 99999 PR PBB SHADOW E&M-EST. PATIENT-LVL IV: ICD-10-PCS | Mod: PBBFAC,,, | Performed by: PHYSICIAN ASSISTANT

## 2020-06-22 PROCEDURE — 99999 PR PBB SHADOW E&M-EST. PATIENT-LVL IV: CPT | Mod: PBBFAC,,, | Performed by: PHYSICIAN ASSISTANT

## 2020-06-22 PROCEDURE — 3008F BODY MASS INDEX DOCD: CPT | Mod: CPTII,S$GLB,, | Performed by: PHYSICIAN ASSISTANT

## 2020-06-22 PROCEDURE — 99213 OFFICE O/P EST LOW 20 MIN: CPT | Mod: S$GLB,,, | Performed by: PHYSICIAN ASSISTANT

## 2020-06-22 PROCEDURE — 3008F PR BODY MASS INDEX (BMI) DOCUMENTED: ICD-10-PCS | Mod: CPTII,S$GLB,, | Performed by: PHYSICIAN ASSISTANT

## 2020-06-22 RX ORDER — ALBUTEROL SULFATE 90 UG/1
1-2 AEROSOL, METERED RESPIRATORY (INHALATION)
Qty: 18 G | Refills: 3 | Status: SHIPPED | OUTPATIENT
Start: 2020-06-22 | End: 2020-10-06

## 2020-06-22 NOTE — PROGRESS NOTES
Subjective:       Patient ID: Milton Berkowitz is a 25 y.o. male.        Chief Complaint: Cyst (on pt abdomin [id number])    Milton Berkowitz is an established patient of Zenaida Duffy MD here today for urgent care visit.    Here today for abdominal wall mass, slowly enlarging over past several months, LLQ, not painful but very noticeable.    Asthma:  He needs a refill of his albuterol inhaler.  No acute issues with asthma at this time.     Panhypopituitarism:  Followed by wilberto.     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  He continues to enjoy marching band.            Review of Systems   Constitutional: Negative for appetite change, chills, fatigue and fever.   HENT: Negative for congestion and sore throat.    Eyes: Negative for visual disturbance.   Respiratory: Negative for cough, chest tightness and shortness of breath.    Cardiovascular: Negative for chest pain, palpitations and leg swelling.   Gastrointestinal: Negative for abdominal pain, blood in stool, constipation, diarrhea, nausea and vomiting.   Genitourinary: Negative for dysuria, frequency, hematuria and urgency.   Musculoskeletal: Negative for arthralgias and back pain.   Skin: Negative for rash.   Neurological: Negative for dizziness, syncope, weakness and headaches.   Psychiatric/Behavioral: Negative for dysphoric mood and sleep disturbance. The patient is not nervous/anxious.        Objective:      Physical Exam  Vitals signs and nursing note reviewed.   Constitutional:       Appearance: He is well-developed.   HENT:      Head: Normocephalic.      Right Ear: External ear normal.      Left Ear: External ear normal.   Eyes:      Pupils: Pupils are equal, round, and reactive to light.   Cardiovascular:      Rate and Rhythm: Normal rate and regular rhythm.      Heart sounds: Normal heart sounds. No murmur. No friction rub. No gallop.    Pulmonary:      Effort: Pulmonary  "effort is normal. No respiratory distress.      Breath sounds: Normal breath sounds.   Abdominal:      Palpations: Abdomen is soft.      Tenderness: There is no abdominal tenderness.       Skin:     General: Skin is warm and dry.   Neurological:      Mental Status: He is alert.         Assessment:       1. Abdominal wall mass of left lower quadrant    2. Asthma, unspecified asthma severity, unspecified whether complicated, unspecified whether persistent        Plan:       Milton was seen today for cyst.    Diagnoses and all orders for this visit:    Abdominal wall mass of left lower quadrant  -     US Abdomen Limited; Future  -     Ambulatory referral/consult to General Surgery; Future    Asthma, unspecified asthma severity, unspecified whether complicated, unspecified whether persistent  -     albuterol (PROVENTIL/VENTOLIN HFA) 90 mcg/actuation inhaler; Inhale 1-2 puffs into the lungs every 4 to 6 hours as needed for Wheezing or Shortness of Breath.  -     inhalation spacing device; Use as directed for inhalation.    Will obtain an US of the area in question and schedule a consult with general surgery.  Refill albuterol as above.    Pt has been given instructions populated from [employer] database and has verbalized understanding of the after visit summary and information contained wherein.    Follow up with a primary care provider. May go to ER for acute shortness of breath, lightheadedness, fever, or any other emergent complaints or changes in condition.    "This note will be shared with the patient"    Future Appointments   Date Time Provider Department Center   6/23/2020 10:15 AM [facility]STER [facility]   6/29/2020  9:30 AM Antelmo Ray MD [facility] JAM Silva                 "

## 2020-06-23 ENCOUNTER — TELEPHONE (OUTPATIENT)
Dept: ENDOCRINOLOGY | Facility: CLINIC | Age: 25
End: 2020-06-23

## 2020-06-23 ENCOUNTER — HOSPITAL ENCOUNTER (OUTPATIENT)
Dept: RADIOLOGY | Facility: HOSPITAL | Age: 25
Discharge: HOME | End: 2020-06-23
Attending: PHYSICIAN ASSISTANT
Payer: COMMERCIAL

## 2020-06-23 ENCOUNTER — PATIENT MESSAGE (OUTPATIENT)
Dept: ENDOCRINOLOGY | Facility: CLINIC | Age: 25
End: 2020-06-23

## 2020-06-23 DIAGNOSIS — E23.0 PANHYPOPITUITARISM: Primary | ICD-10-CM

## 2020-06-23 DIAGNOSIS — R19.04 ABDOMINAL WALL MASS OF LEFT LOWER QUADRANT: ICD-10-CM

## 2020-06-23 PROCEDURE — 76705 ECHO EXAM OF ABDOMEN: CPT | Mod: 26,,, | Performed by: RADIOLOGY

## 2020-06-23 PROCEDURE — 76705 US ABDOMEN LIMITED: ICD-10-PCS | Mod: 26,,, | Performed by: RADIOLOGY

## 2020-06-23 PROCEDURE — 76705 ECHO EXAM OF ABDOMEN: CPT | Mod: TC

## 2020-06-23 RX ORDER — LEVOTHYROXINE SODIUM 112 UG/1
112 TABLET ORAL
Qty: 30 TABLET | Refills: 11 | Status: SHIPPED | OUTPATIENT
Start: 2020-06-23 | End: 2021-05-04 | Stop reason: SDUPTHER

## 2020-06-29 ENCOUNTER — OFFICE VISIT (OUTPATIENT)
Dept: SURGERY | Facility: CLINIC | Age: 25
End: 2020-06-29
Payer: COMMERCIAL

## 2020-06-29 VITALS
WEIGHT: 158.81 LBS | DIASTOLIC BLOOD PRESSURE: 77 MMHG | SYSTOLIC BLOOD PRESSURE: 120 MMHG | BODY MASS INDEX: 27.11 KG/M2 | HEART RATE: 63 BPM | TEMPERATURE: 98 F | HEIGHT: 64 IN

## 2020-06-29 DIAGNOSIS — R19.04 ABDOMINAL WALL MASS OF LEFT LOWER QUADRANT: ICD-10-CM

## 2020-06-29 PROCEDURE — 3008F BODY MASS INDEX DOCD: CPT | Mod: CPTII,S$GLB,, | Performed by: SURGERY

## 2020-06-29 PROCEDURE — 3008F PR BODY MASS INDEX (BMI) DOCUMENTED: ICD-10-PCS | Mod: CPTII,S$GLB,, | Performed by: SURGERY

## 2020-06-29 PROCEDURE — 99999 PR PBB SHADOW E&M-EST. PATIENT-LVL III: CPT | Mod: PBBFAC,,, | Performed by: SURGERY

## 2020-06-29 PROCEDURE — 99999 PR PBB SHADOW E&M-EST. PATIENT-LVL III: ICD-10-PCS | Mod: PBBFAC,,, | Performed by: SURGERY

## 2020-06-29 PROCEDURE — 99201 PR OFFICE/OUTPT VISIT,NEW,LEVL I: CPT | Mod: S$GLB,,, | Performed by: SURGERY

## 2020-06-29 PROCEDURE — 99201 PR OFFICE/OUTPT VISIT,NEW,LEVL I: ICD-10-PCS | Mod: S$GLB,,, | Performed by: SURGERY

## 2020-06-29 NOTE — LETTER
June 29, 2020      Radha Diaz PA-C  [address]  [place] [address]           [facility] - General Surgery  [address]  [facility] [address]  Phone: [phone]          Patient: Milton Berkowitz   MR Number: [id number]   YOB: 1995   Date of Visit: 6/29/2020       Dear Rahda Diaz:    Thank you for referring Milton Berkowitz to me for evaluation. Attached you will find relevant portions of my assessment and plan of care.    If you have questions, please do not hesitate to call me. I look forward to following Milton Berkowitz along with you.    Sincerely,    Antelmo Ray MD    Enclosure  CC:  No Recipients    If you would like to receive this communication electronically, please contact [email or web address] or [phone] to request more information on My Dentist Link access.    For providers and/or their staff who would like to refer a patient to [facility], please contact us through our one-stop-shop provider referral line, [facility] , at [phone].    If you feel you have received this communication in error or would no longer like to receive these types of communications, please e-mail [email or web address]

## 2020-06-29 NOTE — PROGRESS NOTES
History & Physical    SUBJECTIVE:     History of Present Illness:  Patient is a 25 y.o. male presents with a possible LLQ abdominal wall mass.  He states he has had this for multiple months, does not cause him any discomfort.  HE states he was just worried it was a tumor and was concerned about malignancy.        Review of patient's allergies indicates:  No Known Allergies    Current Outpatient Medications   Medication Sig Dispense Refill    albuterol (PROVENTIL/VENTOLIN HFA) 90 mcg/actuation inhaler Inhale 1-2 puffs into the lungs every 4 to 6 hours as needed for Wheezing or Shortness of Breath. 18 g 3    hydrocortisone (CORTEF) 5 MG Tab Take 10 mg in the morning and 5 mg in the evening 100 tablet 11    inhalation spacing device Use as directed for inhalation. 1 Device 0    levothyroxine (SYNTHROID) 112 MCG tablet Take 1 tablet (112 mcg total) by mouth before breakfast. 30 tablet 11    somatropin (NORDITROPIN FLEXPRO) 10 mg/1.5 mL (6.7 mg/mL) PnIj Inject 2.5mg into skin daily in the evening 10.5 mL 5    testosterone (ANDROGEL) 1.62 % (20.25 mg/1.25 gram) GlPk Place 1.25 g onto the skin once daily. Apply topically once daily in the morning to clean, dry, intact skin of the shoulders and upper arms 30 packet 5     No current facility-administered medications for this visit.        Past Medical History:   Diagnosis Date    Allergy     seasonal    Asthma     GHD (growth hormone deficiency) 7/14/2017    Growth hormone deficiency     HGHG (hypogonadotropic hypogonadism) 11/22/2017    Hypothyroidism     Hypothyroidism 11/22/2017    Short stature due to endocrine disorder 7/16/2017     No past surgical history on file.  Family History   Problem Relation Age of Onset    No Known Problems Mother     No Known Problems Father     Cancer Paternal Grandfather         prostate and ENT ca    Melanoma Neg Hx     Psoriasis Neg Hx     Lupus Neg Hx     Acne Neg Hx     Eczema Neg Hx      Social History     Tobacco  "Use    Smoking status: Never Smoker    Smokeless tobacco: Never Used   Substance Use Topics    Alcohol use: No    Drug use: No        Review of Systems:  Review of Systems   Constitutional: Negative for activity change and appetite change.   Respiratory: Negative for apnea and cough.    Gastrointestinal: Negative for abdominal distention and abdominal pain.   Musculoskeletal: Negative for arthralgias.   Hematological: Does not bruise/bleed easily.   Psychiatric/Behavioral: Negative for agitation and behavioral problems.       OBJECTIVE:     Vital Signs (Most Recent)  Temp: 98.2 °F (36.8 °C) (06/29/20 0845)  Pulse: 63 (06/29/20 0845)  BP: 120/77 (06/29/20 0845)  5' 4" (1.626 m)  72 kg (158 lb 13.5 oz)     Physical Exam:  Physical Exam  Constitutional:       Appearance: Normal appearance.   HENT:      Head: Normocephalic.   Eyes:      Extraocular Movements: Extraocular movements intact.      Pupils: Pupils are equal, round, and reactive to light.   Neck:      Musculoskeletal: Normal range of motion.   Pulmonary:      Effort: Pulmonary effort is normal. No respiratory distress.   Abdominal:      General: There is no distension.      Palpations: Abdomen is soft. There is no mass.      Tenderness: There is no abdominal tenderness.      Hernia: No hernia is present.      Comments: Asymmetry to lower abdomen L>R.  No mass palpable.  Nontender to palpation.   Musculoskeletal: Normal range of motion.   Skin:     General: Skin is warm and dry.   Neurological:      General: No focal deficit present.      Mental Status: He is alert and oriented to person, place, and time.   Psychiatric:         Mood and Affect: Mood normal.         Behavior: Behavior normal.         Diagnostic Results:  US: Reviewed  No mass appreciated     ASSESSMENT/PLAN:     25 y.o male with abdominal wall asymetry    PLAN:Plan     No plan for any surgical intervention.  Discussed weight loss with patient.  He plans to work on losing some weight with " diet and exercise.

## 2020-07-09 ENCOUNTER — TELEPHONE (OUTPATIENT)
Dept: PHARMACY | Facility: CLINIC | Age: 25
End: 2020-07-09

## 2020-07-09 ENCOUNTER — TELEPHONE (OUTPATIENT)
Dept: ENDOCRINOLOGY | Facility: CLINIC | Age: 25
End: 2020-07-09

## 2020-07-09 DIAGNOSIS — E23.0 GHD (GROWTH HORMONE DEFICIENCY): Primary | ICD-10-CM

## 2020-07-09 DIAGNOSIS — E34.30 SHORT STATURE DUE TO ENDOCRINE DISORDER: ICD-10-CM

## 2020-07-09 DIAGNOSIS — E23.0 PANHYPOPITUITARISM: ICD-10-CM

## 2020-07-09 RX ORDER — SOMATROPIN 10 MG/1.5ML
INJECTION, SOLUTION SUBCUTANEOUS
Qty: 12 ML | Refills: 5 | Status: SHIPPED | OUTPATIENT
Start: 2020-07-09 | End: 2020-12-02 | Stop reason: SDUPTHER

## 2020-07-09 NOTE — TELEPHONE ENCOUNTER
26 YO Male with Pan Hypopituitarism on Hormonal replacement.      -Bone age 15y and 6 months (On Norditropin 2.5mg day)  -Epiphyseal plates not fused   -IGF-1 level  139 (Below goal of 300  400, +1-2 SD)   -FT4 (low) ON LT4 (Dose increased to 112mcg)   -Electrolytes, BP and Glucose WNL (On HC 10/5mg)   -Testosterone  667 (At goal)       Due to Bone age of 15y and 6 months in a 24Yo Male with epiphyseal plates not fused will do the following recommendations.     Plan:   -Increase Nortitropin to 3mg daily (20% increase (Around 0.3mg/kg/week)       -IGF-1 Goal of 300  400, +1-2 SD)     -Increase LT4 to  112mcg (Has not started yet)     -C/w HC 10/5mg     -C/w Current Testosterone dose     -Check FT4 in 6 - 8 weeks     -Check IGF-1 and Bone age in 6 months     -Check T levels at next clinic visit       CALLED PATIENT BUT NO ANSWER,  WILL ATTEMPT AT A LATER TIME

## 2020-07-09 NOTE — TELEPHONE ENCOUNTER
Refill readiness for Norditropin confirmed with patient; name/ confirmed; no missed doses; no new medications; no side effects noted; address confirmed for  shipment and  delivery. Patient does NOT need pen needles or sharps container at this time. $0 copay.     [facility] spoke with patient in regards to increased dose to 3 mg SQ QPM. Patient is aware of dose change and states he has 1 pen remaining (3 DS) after finishing current pen.

## 2020-07-14 NOTE — TELEPHONE ENCOUNTER
DOCUMENTATION ONLY:  Prior authorization for NORDITROPIN approved from 7/14/2020 to 7/13/2021.    Case ID# [id number]    Co-pay: $0.00, FA on file covers $250.00 copay    Patient Assistance IS NOT required.    Forwarding to [facility] for review and shipment. -HBR

## 2020-07-17 ENCOUNTER — TELEPHONE (OUTPATIENT)
Dept: PHARMACY | Facility: CLINIC | Age: 25
End: 2020-07-17

## 2020-08-04 ENCOUNTER — TELEPHONE (OUTPATIENT)
Dept: PHARMACY | Facility: CLINIC | Age: 25
End: 2020-08-04

## 2020-08-06 ENCOUNTER — TELEPHONE (OUTPATIENT)
Dept: PHARMACY | Facility: CLINIC | Age: 25
End: 2020-08-06

## 2020-08-06 RX ORDER — TESTOSTERONE 20.25 MG/1.25G
GEL TOPICAL
Qty: 30 PACKET | Refills: 5 | Status: SHIPPED | OUTPATIENT
Start: 2020-08-06 | End: 2020-08-11 | Stop reason: SDUPTHER

## 2020-08-10 NOTE — TELEPHONE ENCOUNTER
Financial Assistance for Norditropin approved from 8/1/20 to 8/1/21  Source [place]  BIN: [id number]  PCN: [id number]  Id: [id number]  GRP: [id number]  SAVINGS $ 3000

## 2020-08-11 RX ORDER — TESTOSTERONE 20.25 MG/1.25G
GEL TOPICAL
Qty: 30 PACKET | Refills: 5 | OUTPATIENT
Start: 2020-08-11

## 2020-09-09 ENCOUNTER — TELEPHONE (OUTPATIENT)
Dept: PHARMACY | Facility: CLINIC | Age: 25
End: 2020-09-09

## 2020-09-11 ENCOUNTER — TELEPHONE (OUTPATIENT)
Dept: PHARMACY | Facility: CLINIC | Age: 25
End: 2020-09-11

## 2020-09-11 NOTE — TELEPHONE ENCOUNTER
Outreach attempt #1 for clinical followup of Norditropin. Patient did not answer, LVM to return call. Will try again in 1 week.

## 2020-09-25 NOTE — TELEPHONE ENCOUNTER
Norditropin clinical follow up assessment attempted (Attempt 3). No answer. LVM for call back. Will f/u with patient at refill activity.   - Reached patient's mom at home phone. She states the patient is in [place] for school. She states it would be best to complete follow up with patient - He manages his own medications.

## 2020-10-05 NOTE — TELEPHONE ENCOUNTER
Outreach to patient for Norditropin refill (attempt 1) and clinical follow up assessment (Attempt 4). Patient did not answer, LVM for call back. Will try again later this week.

## 2020-10-06 DIAGNOSIS — J45.909 ASTHMA, UNSPECIFIED ASTHMA SEVERITY, UNSPECIFIED WHETHER COMPLICATED, UNSPECIFIED WHETHER PERSISTENT: ICD-10-CM

## 2020-10-06 RX ORDER — ALBUTEROL SULFATE 90 UG/1
1-2 AEROSOL, METERED RESPIRATORY (INHALATION)
Qty: 18 G | Refills: 3 | Status: SHIPPED | OUTPATIENT
Start: 2020-10-06 | End: 2021-05-04 | Stop reason: SDUPTHER

## 2020-10-10 ENCOUNTER — TELEPHONE (OUTPATIENT)
Dept: PHARMACY | Facility: CLINIC | Age: 25
End: 2020-10-10

## 2020-10-10 NOTE — TELEPHONE ENCOUNTER
Refill call regarding Norditropin at [facility].  Spoke with patient's mother and with her consent will prepare for shipment on 10/13 to arrive 10/14.  Copay 0.00.  Patient's mother stated the patient is away at school and she goes to give him the shot but she is not sure of his on hand count.  No sharps needed.  Patient has not started any new medications including OTC drugs. Patient has not had any medication/ dose or instruction changes. No new allergies or side effects reported with this shipment. Medication is being taken as prescribed by physician and properly stored. Two patient identifiers:  and Address verified. Patient has no questions or concerns for [facility].

## 2020-10-22 ENCOUNTER — SPECIALTY PHARMACY (OUTPATIENT)
Dept: PHARMACY | Facility: CLINIC | Age: 25
End: 2020-10-22

## 2020-10-22 DIAGNOSIS — E23.0 HGHG (HYPOGONADOTROPIC HYPOGONADISM): Primary | ICD-10-CM

## 2020-10-22 NOTE — TELEPHONE ENCOUNTER
Specialty Pharmacy - Clinical Reassessment    Specialty Medication Orders Linked to Encounter      Most Recent Value   Medication #1  somatropin (NORDITROPIN FLEXPRO) 10 mg/1.5 mL (6.7 mg/mL) PnIj (Order#[id number], Rx#[id number])        Subjective    Milton Berkowitz is a 25 y.o. male, who is followed by the specialty pharmacy service for management and education.    Encounters since last clinical assessment   No encounters found.   Clinical call attempts since last clinical assessment   10/20/2020  9:04 PM - Specialty Pharmacy - Clinical Reassessment by Felicia Tello PharmD     Today he received follow up education for his specialty medication(s).    Current Outpatient Medications   Medication Sig    albuterol (PROVENTIL/VENTOLIN HFA) 90 mcg/actuation inhaler INHALE 1-2 PUFFS INTO THE LUNGS EVERY 4 TO 6 HOURS AS NEEDED FOR WHEEZING OR SHORTNESS OF BREATH.    hydrocortisone (CORTEF) 5 MG Tab Take 10 mg in the morning and 5 mg in the evening    inhalation spacing device Use as directed for inhalation.    levothyroxine (SYNTHROID) 112 MCG tablet Take 1 tablet (112 mcg total) by mouth before breakfast.    somatropin (NORDITROPIN FLEXPRO) 10 mg/1.5 mL (6.7 mg/mL) PnIj Inject 3 mg into the skin daily in the evening    testosterone (ANDROGEL) 1.62 % (20.25 mg/1.25 gram) GlPk Place 1.25 g onto the skin once daily. Apply topically once daily in the morning to clean, dry, intact skin of the shoulders and upper arms   Last reviewed on 10/22/2020  9:57 AM by Felicia Tello PharmD    Review of patient's allergies indicates:  No Known AllergiesLast reviewed on  10/22/2020 9:57 AM by Felicia Tello    Drug Interactions    Drug interactions evaluated: yes  Clinically relevant drug interactions identified: no  Provided the patient with educational material regarding drug interactions: not applicable       Medication Adherence    Patient reported X missed doses in the last month: 0  Any gaps in refill history greater  "than 2 weeks in the last 3 months: no  Demonstrates understanding of importance of adherence: yes  Informant: patient  Reliability of informant: fairly reliable  Provider-estimated medication adherence level: %  Reasons for non-adherence: no problems identified   Other adherence tool: incorporates into his routine   Support network for adherence: family member  Confirmed plan for next specialty medication refill: not applicable  Refills needed for supportive medications: not needed       Adverse Effects    *All other systems reviewed and are negative       Assessment Questions - Documented Responses      Most Recent Value   Assessment   Medication Reconciliation completed for patient  Yes   During the past 4 weeks, has patient missed any activities due to condition or medication?  No   During the past 4 weeks, did patient have any of the following urgent care visits?  None   Goals of Therapy Status  Achieving   Welcome packet contents reviewed and discussed with patient?  No   Assesment completed?  Yes   Plan  Therapy continued   Do you need to open a clinical intervention (i-vent)?  No   Do you want to schedule first shipment?  No   Medication #1 Assessment Info   Patient status  Existing medication, Exisiting to [facility]   Is this medication appropriate for the patient?  Yes   Is this medication effective?  Yes          Objective    He has a past medical history of Allergy, Asthma, GHD (growth hormone deficiency) (7/14/2017), Growth hormone deficiency, HGHG (hypogonadotropic hypogonadism) (11/22/2017), Hypothyroidism, Hypothyroidism (11/22/2017), and Short stature due to endocrine disorder (7/16/2017).    BP Readings from Last 4 Encounters:   06/29/20 120/77   06/22/20 112/60   06/15/20 110/80   11/27/19 118/74     Ht Readings from Last 4 Encounters:   06/29/20 5' 4" (1.626 m)   06/22/20 5' 4" (1.626 m)   06/15/20 5' 4.5" (1.638 m)   11/27/19 5' 3" (1.6 m)     Wt Readings from Last 4 Encounters:   06/29/20 72 kg " (158 lb 13.5 oz)   06/22/20 71 kg (156 lb 8.4 oz)   06/15/20 69.5 kg (153 lb 3.5 oz)   11/27/19 64.8 kg (142 lb 15.5 oz)       The goals of prescribed drug therapy management include:  · Supporting patient to meet the prescriber's medical treatment objectives  · Improving or maintaining quality of life  · Maintaining optimal therapy adherence  · Minimizing and managing side effects      Goals of Therapy Status: Achieving    Assessment/Plan  Patient plans to continue therapy without changes      Indication, dosage, appropriateness, effectiveness, safety and convenience of his specialty medication(s) were reviewed today.     Patient Counseling    Counseled the patient on the following: doses and administration discussed, safe handling, storage, and disposal discussed, possible adverse effects and management discussed, possible drug and prescription drug interactions discussed, possible drug and OTC drug and food interactions discussed, lab monitoring and follow-up discussed, therapeutic rationale discussed, cost of medications and cost implications discussed, adherence and missed doses discussed, pharmacy contact information discussed       Norditropin reassessment complete.  Pt is very comfortable with the injection process. He injects in his arms and stomach and rotates injection sites. Pt denies missed doses and states he incorporated taking his medication into his routine - advised to use phone alarm or calendar if he ever has trouble remembering to take his medication. Pt stores in the refrigerator. Pt denies side effects or adverse reactions.  Medications reviewed and up to date. No clinically significant DDI's identified. Comorbidities reviewed. Allergies reviewed. Labs reviewed. Therapy appropriate to continue.  Pt denies any missed days of work, missed plans, or trips to the ER/urgent care in the last month. Pt advised to keep up with all labs an appts. [facility] will follow up in 180 days. Pt had no further  questions.     Tasks added this encounter   No tasks added.   Tasks due within next 3 months   11/4/2020 - Refill Call  10/20/2020 - Clinical - Follow Up Assesement (90 day)     Felicia Tello, PharmD  [facility]  [address] [place] [id number]  Phone: [phone]  Fax: [phone]

## 2020-11-04 ENCOUNTER — SPECIALTY PHARMACY (OUTPATIENT)
Dept: PHARMACY | Facility: CLINIC | Age: 25
End: 2020-11-04

## 2020-11-04 NOTE — TELEPHONE ENCOUNTER
Specialty Pharmacy - Refill Coordination        Refill Questions - Documented Responses      Most Recent Value   Relationship to patient of person spoken to?  Mother   HIPAA/medical authority confirmed?  Yes   Any changes in contact preferences or allowed representatives?  No   Has the patient had any insurance changes?  No   Has the patient had any changes to specialty medication, dose, or instructions?  No   Has the patient started taking any new medications, herbals, or supplements?  No   Has the patient been diagnosed with any new medical conditions?  No   Does the patient have any new allergies to medications or foods?  No   Does the patient have any concerns about side effects?  No   Can the patient store medication/sharps container properly (at the correct temperature, away from children/pets, etc.)?  Yes   Can the patient call emergency services (911) in the event of an emergency?  Yes   Does the patient have any concerns or questions about taking or administering this medication as prescribed?  No   How many doses did the patient miss in the past 4 weeks or since the last fill?  0   How many doses does the patient have on hand?  7   How many days does the patient report on hand quantity will last?  7   Does the number of doses/days supply remaining match pharmacy expected amounts?  Yes   How will the patient receive the medication?  Mail   When does the patient need to receive the medication?  11/12/20   Shipping Address  Home   Address in [facility] confirmed and updated if neccessary?  Yes   Expected Copay ($)  0   Is the patient able to afford the medication copay?  Yes   Payment Method  zero copay   Days supply of Refill  30   Would patient like to speak to a pharmacist?  No   Do you want to trigger an intervention?  No   Do you want to trigger an additional referral task?  No   Refill activity completed?  Yes   Refill activity plan  Refill scheduled   Shipment/Pickup Date:  11/09/20           Current Outpatient Medications   Medication Sig    albuterol (PROVENTIL/VENTOLIN HFA) 90 mcg/actuation inhaler INHALE 1-2 PUFFS INTO THE LUNGS EVERY 4 TO 6 HOURS AS NEEDED FOR WHEEZING OR SHORTNESS OF BREATH.    hydrocortisone (CORTEF) 5 MG Tab Take 10 mg in the morning and 5 mg in the evening    inhalation spacing device Use as directed for inhalation.    levothyroxine (SYNTHROID) 112 MCG tablet Take 1 tablet (112 mcg total) by mouth before breakfast.    somatropin (NORDITROPIN FLEXPRO) 10 mg/1.5 mL (6.7 mg/mL) PnIj Inject 3 mg into the skin daily in the evening    testosterone (ANDROGEL) 1.62 % (20.25 mg/1.25 gram) GlPk Place 1.25 g onto the skin once daily. Apply topically once daily in the morning to clean, dry, intact skin of the shoulders and upper arms   Last reviewed on 10/22/2020  9:57 AM by Felicia Tello, [place]    Review of patient's allergies indicates:  No Known Allergies Last reviewed on  10/22/2020 9:57 AM by Felicia Tello      Tasks added this encounter   No tasks added.   Tasks due within next 3 months   11/4/2020 - Refill Call     Rafael Zacarias  [facility]  [address] [place] [id number]  Phone: [phone]  Fax: [phone]

## 2020-11-12 NOTE — PROGRESS NOTES
Received call back from pt. He states he has been taking lisinopril-HCTZ 1 tab daily and reports no current issues with his BP readings. Discussed with pt, per last OV notes with TW, recommends pt cut dose in half. Pt clarified this is recommended only if his BP is low. Advised pt to continue checking his BP regularly to monitor this. Verbalized understanding. Prescription reordered for 1 tab daily.   Subjective:       Patient ID: Milton Berkowitz is a 24 y.o. male.        Chief Complaint: Asthma    Milton Berkowitz is an established patient of Zenaida Duffy MD here today for urgent care visit.    Asthma:  He needs a refill of his albuterol inhaler.  No acute issues with asthma at this time.    Panhypopituitarism:  Overdue for f/u with endo.  Discussed this in detail today and given phone number to call to schedule appointment as he is only home for a short time this summer.    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  He continues to enjoy marching band.           Review of Systems   Constitutional: Negative for appetite change, chills, fatigue and fever.   HENT: Negative for congestion and sore throat.    Eyes: Negative for visual disturbance.   Respiratory: Negative for cough, chest tightness and shortness of breath.    Cardiovascular: Negative for chest pain, palpitations and leg swelling.   Gastrointestinal: Negative for abdominal pain, blood in stool, constipation, diarrhea, nausea and vomiting.   Genitourinary: Negative for dysuria, frequency, hematuria and urgency.   Musculoskeletal: Negative for arthralgias and back pain.   Skin: Negative for rash.   Neurological: Negative for dizziness, syncope, weakness and headaches.   Psychiatric/Behavioral: Negative for dysphoric mood and sleep disturbance. The patient is not nervous/anxious.        Objective:      Physical Exam   Constitutional: He appears well-developed and well-nourished.   HENT:   Head: Normocephalic.   Right Ear: External ear normal.   Left Ear: External ear normal.   Mouth/Throat: Oropharynx is clear and moist.   Eyes: Pupils are equal, round, and reactive to light.   Cardiovascular: Normal rate, regular rhythm and normal heart sounds. Exam reveals no gallop and no friction rub.   No murmur heard.  Pulmonary/Chest: Effort normal and breath sounds normal. No  "respiratory distress.   Abdominal: Soft. There is no tenderness.   Musculoskeletal: He exhibits no edema.   Neurological: He is alert.   Skin: Skin is warm and dry.   Psychiatric: He has a normal mood and affect.   Nursing note and vitals reviewed.      Assessment:       1. Asthma, unspecified asthma severity, unspecified whether complicated, unspecified whether persistent        Plan:       Milton was seen today for asthma.    Diagnoses and all orders for this visit:    Asthma, unspecified asthma severity, unspecified whether complicated, unspecified whether persistent  -     REFILL: albuterol (PROVENTIL/VENTOLIN HFA) 90 mcg/actuation inhaler; Inhale 1-2 puffs into the lungs every 4 to 6 hours as needed for Wheezing or Shortness of Breath.    Importance of f/u with endocrine reinforced today.    Pt has been given instructions populated from [employer] database and has verbalized understanding of the after visit summary and information contained wherein.    Follow up with a primary care provider. May go to ER for acute shortness of breath, lightheadedness, fever, or any other emergent complaints or changes in condition.    "This note will be shared with the patient"    No future appointments.            "

## 2020-12-02 DIAGNOSIS — E23.0 PANHYPOPITUITARISM: ICD-10-CM

## 2020-12-02 DIAGNOSIS — E34.30 SHORT STATURE DUE TO ENDOCRINE DISORDER: ICD-10-CM

## 2020-12-03 ENCOUNTER — SPECIALTY PHARMACY (OUTPATIENT)
Dept: PHARMACY | Facility: CLINIC | Age: 25
End: 2020-12-03

## 2020-12-03 RX ORDER — SOMATROPIN 10 MG/1.5ML
INJECTION, SOLUTION SUBCUTANEOUS
Qty: 12 ML | Refills: 5 | Status: SHIPPED | OUTPATIENT
Start: 2020-12-03 | End: 2021-05-04 | Stop reason: SDUPTHER

## 2020-12-03 NOTE — TELEPHONE ENCOUNTER
Specialty Pharmacy - Refill Coordination    Specialty Medication Orders Linked to Encounter      Most Recent Value   Medication #1  somatropin (NORDITROPIN FLEXPRO) 10 mg/1.5 mL (6.7 mg/mL) PnIj (Order#[id number], Rx#[id number])          Refill Questions - Documented Responses      Most Recent Value   Relationship to patient of person spoken to?  Mother   HIPAA/medical authority confirmed?  Yes   Any changes in contact preferences or allowed representatives?  No   Has the patient had any insurance changes?  No   Has the patient had any changes to specialty medication, dose, or instructions?  No   Has the patient started taking any new medications, herbals, or supplements?  No   Has the patient been diagnosed with any new medical conditions?  No   Does the patient have any new allergies to medications or foods?  No   Does the patient have any concerns about side effects?  No   Can the patient store medication/sharps container properly (at the correct temperature, away from children/pets, etc.)?  Yes   Can the patient call emergency services (911) in the event of an emergency?  Yes   Does the patient have any concerns or questions about taking or administering this medication as prescribed?  No   How many doses did the patient miss in the past 4 weeks or since the last fill?  0   How many doses does the patient have on hand?  3   How many days does the patient report on hand quantity will last?  3   Does the number of doses/days supply remaining match pharmacy expected amounts?  Yes   Does the patient feel that this medication is effective?  Yes   During the past 4 weeks, has patient missed any activities due to condition or medication?  No   During the past 4 weeks, did patient have any of the following urgent care visits?  None   How will the patient receive the medication?  Mail   When does the patient need to receive the medication?  12/06/20   Shipping Address  Home   Address in [facility] confirmed and  updated if neccessary?  Yes   Expected Copay ($)  0   Is the patient able to afford the medication copay?  Yes   Payment Method  zero copay   Days supply of Refill  26   Would patient like to speak to a pharmacist?  No   Do you want to trigger an intervention?  No   Do you want to trigger an additional referral task?  No   Refill activity completed?  Yes   Refill activity plan  Refill scheduled   Shipment/Pickup Date:  12/03/20          Current Outpatient Medications   Medication Sig    albuterol (PROVENTIL/VENTOLIN HFA) 90 mcg/actuation inhaler INHALE 1-2 PUFFS INTO THE LUNGS EVERY 4 TO 6 HOURS AS NEEDED FOR WHEEZING OR SHORTNESS OF BREATH.    hydrocortisone (CORTEF) 5 MG Tab Take 10 mg in the morning and 5 mg in the evening    inhalation spacing device Use as directed for inhalation.    levothyroxine (SYNTHROID) 112 MCG tablet Take 1 tablet (112 mcg total) by mouth before breakfast.    somatropin (NORDITROPIN FLEXPRO) 10 mg/1.5 mL (6.7 mg/mL) PnIj Inject 3 mg into the skin daily in the evening    testosterone (ANDROGEL) 1.62 % (20.25 mg/1.25 gram) GlPk Place 1.25 g onto the skin once daily. Apply topically once daily in the morning to clean, dry, intact skin of the shoulders and upper arms   Last reviewed on 10/22/2020  9:57 AM by Felicia Tello, PharmTORREY    Review of patient's allergies indicates:  No Known Allergies Last reviewed on  10/22/2020 9:57 AM by Felicia Tello      Tasks added this encounter   No tasks added.   Tasks due within next 3 months   12/2/2020 - Refill Call (Auto Added)     [facility]  [address] [place] [id number]  Phone: [phone]  Fax: [phone]

## 2020-12-07 ENCOUNTER — PATIENT MESSAGE (OUTPATIENT)
Dept: ENDOCRINOLOGY | Facility: CLINIC | Age: 25
End: 2020-12-07

## 2020-12-28 ENCOUNTER — SPECIALTY PHARMACY (OUTPATIENT)
Dept: PHARMACY | Facility: CLINIC | Age: 25
End: 2020-12-28

## 2020-12-28 NOTE — TELEPHONE ENCOUNTER
Specialty Pharmacy - Refill Coordination    Specialty Medication Orders Linked to Encounter      Most Recent Value   Medication #1  somatropin (NORDITROPIN FLEXPRO) 10 mg/1.5 mL (6.7 mg/mL) PnIj (Order#[id number], Rx#[id number])          Refill Questions - Documented Responses      Most Recent Value   Relationship to patient of person spoken to?  Self   HIPAA/medical authority confirmed?  Yes   Any changes in contact preferences or allowed representatives?  No   Has the patient had any insurance changes?  No   Has the patient had any changes to specialty medication, dose, or instructions?  No   Has the patient started taking any new medications, herbals, or supplements?  No   Has the patient been diagnosed with any new medical conditions?  No   Does the patient have any new allergies to medications or foods?  No   Does the patient have any concerns about side effects?  No   Can the patient store medication/sharps container properly (at the correct temperature, away from children/pets, etc.)?  Yes   Can the patient call emergency services (911) in the event of an emergency?  Yes   Does the patient have any concerns or questions about taking or administering this medication as prescribed?  No   How many doses did the patient miss in the past 4 weeks or since the last fill?  0   How many doses does the patient have on hand?  7   How many days does the patient report on hand quantity will last?  7   Does the number of doses/days supply remaining match pharmacy expected amounts?  Yes   Does the patient feel that this medication is effective?  Yes   During the past 4 weeks, has patient missed any activities due to condition or medication?  No   During the past 4 weeks, did patient have any of the following urgent care visits?  None   How will the patient receive the medication?  Mail   When does the patient need to receive the medication?  01/05/21   Shipping Address  Home   Address in [facility] confirmed and  updated if neccessary?  Yes   Expected Copay ($)  0   Is the patient able to afford the medication copay?  Yes   Payment Method  zero copay   Days supply of Refill  26   Would patient like to speak to a pharmacist?  No   Do you want to trigger an intervention?  No   Do you want to trigger an additional referral task?  No   Refill activity completed?  Yes   Refill activity plan  Refill scheduled   Shipment/Pickup Date:  01/04/21          Current Outpatient Medications   Medication Sig    albuterol (PROVENTIL/VENTOLIN HFA) 90 mcg/actuation inhaler INHALE 1-2 PUFFS INTO THE LUNGS EVERY 4 TO 6 HOURS AS NEEDED FOR WHEEZING OR SHORTNESS OF BREATH.    hydrocortisone (CORTEF) 5 MG Tab Take 10 mg in the morning and 5 mg in the evening    inhalation spacing device Use as directed for inhalation.    levothyroxine (SYNTHROID) 112 MCG tablet Take 1 tablet (112 mcg total) by mouth before breakfast.    somatropin (NORDITROPIN FLEXPRO) 10 mg/1.5 mL (6.7 mg/mL) PnIj Inject 3 mg into the skin daily in the evening    testosterone (ANDROGEL) 1.62 % (20.25 mg/1.25 gram) GlPk Place 1.25 g onto the skin once daily. Apply topically once daily in the morning to clean, dry, intact skin of the shoulders and upper arms   Last reviewed on 10/22/2020  9:57 AM by Felicia Tello, PharmTORREY    Review of patient's allergies indicates:  No Known Allergies Last reviewed on  10/22/2020 9:57 AM by Felicia Tello      Tasks added this encounter   1/20/2021 - Refill Call (Auto Added)   Tasks due within next 3 months   No tasks due.     Alexandr Michelle  [facility]  [address] [place] [id number]  Phone: [phone]  Fax: [phone]

## 2021-01-07 ENCOUNTER — OFFICE VISIT (OUTPATIENT)
Dept: ENDOCRINOLOGY | Facility: CLINIC | Age: 26
End: 2021-01-07
Payer: COMMERCIAL

## 2021-01-07 ENCOUNTER — PATIENT MESSAGE (OUTPATIENT)
Dept: ENDOCRINOLOGY | Facility: CLINIC | Age: 26
End: 2021-01-07

## 2021-01-07 DIAGNOSIS — E55.9 VITAMIN D DEFICIENCY DISEASE: ICD-10-CM

## 2021-01-07 DIAGNOSIS — E23.0 GHD (GROWTH HORMONE DEFICIENCY): ICD-10-CM

## 2021-01-07 DIAGNOSIS — E23.0 PANHYPOPITUITARISM: Primary | ICD-10-CM

## 2021-01-07 PROCEDURE — 99214 PR OFFICE/OUTPT VISIT, EST, LEVL IV, 30-39 MIN: ICD-10-PCS | Mod: 95,,, | Performed by: INTERNAL MEDICINE

## 2021-01-07 PROCEDURE — 99214 OFFICE O/P EST MOD 30 MIN: CPT | Mod: 95,,, | Performed by: INTERNAL MEDICINE

## 2021-01-07 RX ORDER — ACETAMINOPHEN 500 MG
1 TABLET ORAL DAILY
Start: 2021-01-07 | End: 2021-05-04 | Stop reason: SDUPTHER

## 2021-01-07 RX ORDER — TESTOSTERONE 20.25 MG/1.25G
GEL TOPICAL
Qty: 30 PACKET | Refills: 5 | Status: SHIPPED | OUTPATIENT
Start: 2021-01-07 | End: 2021-05-04 | Stop reason: SDUPTHER

## 2021-01-08 ENCOUNTER — HOSPITAL ENCOUNTER (OUTPATIENT)
Dept: RADIOLOGY | Facility: HOSPITAL | Age: 26
Discharge: HOME | End: 2021-01-08
Attending: INTERNAL MEDICINE
Payer: COMMERCIAL

## 2021-01-08 DIAGNOSIS — E23.0 PANHYPOPITUITARISM: ICD-10-CM

## 2021-01-08 PROCEDURE — 77072 BONE AGE STUDIES: CPT | Mod: 26,,, | Performed by: RADIOLOGY

## 2021-01-08 PROCEDURE — 77072 XR BONE AGE STUDY: ICD-10-PCS | Mod: 26,,, | Performed by: RADIOLOGY

## 2021-01-08 PROCEDURE — 77072 BONE AGE STUDIES: CPT | Mod: TC

## 2021-01-20 ENCOUNTER — SPECIALTY PHARMACY (OUTPATIENT)
Dept: PHARMACY | Facility: CLINIC | Age: 26
End: 2021-01-20

## 2021-02-10 ENCOUNTER — SPECIALTY PHARMACY (OUTPATIENT)
Dept: PHARMACY | Facility: CLINIC | Age: 26
End: 2021-02-10

## 2021-03-10 ENCOUNTER — SPECIALTY PHARMACY (OUTPATIENT)
Dept: PHARMACY | Facility: CLINIC | Age: 26
End: 2021-03-10

## 2021-03-29 ENCOUNTER — OFFICE VISIT (OUTPATIENT)
Dept: URGENT CARE | Facility: CLINIC | Age: 26
End: 2021-03-29
Payer: COMMERCIAL

## 2021-03-29 VITALS
HEART RATE: 73 BPM | WEIGHT: 150 LBS | SYSTOLIC BLOOD PRESSURE: 105 MMHG | OXYGEN SATURATION: 97 % | BODY MASS INDEX: 25.61 KG/M2 | TEMPERATURE: 100 F | HEIGHT: 64 IN | RESPIRATION RATE: 18 BRPM | DIASTOLIC BLOOD PRESSURE: 65 MMHG

## 2021-03-29 DIAGNOSIS — R11.2 NON-INTRACTABLE VOMITING WITH NAUSEA, UNSPECIFIED VOMITING TYPE: ICD-10-CM

## 2021-03-29 DIAGNOSIS — U07.1 COVID-19 VIRUS INFECTION: Primary | ICD-10-CM

## 2021-03-29 DIAGNOSIS — U07.1 COVID-19 VIRUS DETECTED: ICD-10-CM

## 2021-03-29 LAB
CTP QC/QA: YES
SARS-COV-2 RDRP RESP QL NAA+PROBE: POSITIVE

## 2021-03-29 PROCEDURE — U0002 COVID-19 LAB TEST NON-CDC: HCPCS | Mod: QW,S$GLB,, | Performed by: STUDENT IN AN ORGANIZED HEALTH CARE EDUCATION/TRAINING PROGRAM

## 2021-03-29 PROCEDURE — 99213 PR OFFICE/OUTPT VISIT, EST, LEVL III, 20-29 MIN: ICD-10-PCS | Mod: S$GLB,,, | Performed by: STUDENT IN AN ORGANIZED HEALTH CARE EDUCATION/TRAINING PROGRAM

## 2021-03-29 PROCEDURE — 3008F BODY MASS INDEX DOCD: CPT | Mod: CPTII,S$GLB,, | Performed by: STUDENT IN AN ORGANIZED HEALTH CARE EDUCATION/TRAINING PROGRAM

## 2021-03-29 PROCEDURE — U0002: ICD-10-PCS | Mod: QW,S$GLB,, | Performed by: STUDENT IN AN ORGANIZED HEALTH CARE EDUCATION/TRAINING PROGRAM

## 2021-03-29 PROCEDURE — 99213 OFFICE O/P EST LOW 20 MIN: CPT | Mod: S$GLB,,, | Performed by: STUDENT IN AN ORGANIZED HEALTH CARE EDUCATION/TRAINING PROGRAM

## 2021-03-29 PROCEDURE — 3008F PR BODY MASS INDEX (BMI) DOCUMENTED: ICD-10-PCS | Mod: CPTII,S$GLB,, | Performed by: STUDENT IN AN ORGANIZED HEALTH CARE EDUCATION/TRAINING PROGRAM

## 2021-03-29 RX ORDER — ONDANSETRON 4 MG/1
4 TABLET, FILM COATED ORAL EVERY 6 HOURS PRN
Qty: 30 TABLET | Refills: 0 | Status: SHIPPED | OUTPATIENT
Start: 2021-03-29

## 2021-03-29 RX ORDER — ONDANSETRON 8 MG/1
8 TABLET, ORALLY DISINTEGRATING ORAL
Status: COMPLETED | OUTPATIENT
Start: 2021-03-29 | End: 2021-03-29

## 2021-03-29 RX ADMIN — ONDANSETRON 8 MG: 8 TABLET, ORALLY DISINTEGRATING ORAL at 07:03

## 2021-04-05 ENCOUNTER — PATIENT MESSAGE (OUTPATIENT)
Dept: PHARMACY | Facility: CLINIC | Age: 26
End: 2021-04-05

## 2021-04-13 ENCOUNTER — SPECIALTY PHARMACY (OUTPATIENT)
Dept: PHARMACY | Facility: CLINIC | Age: 26
End: 2021-04-13

## 2021-05-03 ENCOUNTER — TELEPHONE (OUTPATIENT)
Dept: ENDOCRINOLOGY | Facility: CLINIC | Age: 26
End: 2021-05-03

## 2021-05-04 ENCOUNTER — OFFICE VISIT (OUTPATIENT)
Dept: ENDOCRINOLOGY | Facility: CLINIC | Age: 26
End: 2021-05-04
Payer: COMMERCIAL

## 2021-05-04 DIAGNOSIS — J45.909 ASTHMA, UNSPECIFIED ASTHMA SEVERITY, UNSPECIFIED WHETHER COMPLICATED, UNSPECIFIED WHETHER PERSISTENT: ICD-10-CM

## 2021-05-04 DIAGNOSIS — E55.9 VITAMIN D DEFICIENCY DISEASE: ICD-10-CM

## 2021-05-04 DIAGNOSIS — E23.0 HGHG (HYPOGONADOTROPIC HYPOGONADISM): ICD-10-CM

## 2021-05-04 DIAGNOSIS — E34.30 SHORT STATURE DUE TO ENDOCRINE DISORDER: ICD-10-CM

## 2021-05-04 DIAGNOSIS — E03.8 CENTRAL HYPOTHYROIDISM: ICD-10-CM

## 2021-05-04 DIAGNOSIS — E27.49 SECONDARY ADRENAL INSUFFICIENCY: ICD-10-CM

## 2021-05-04 DIAGNOSIS — E23.0 GHD (GROWTH HORMONE DEFICIENCY): ICD-10-CM

## 2021-05-04 DIAGNOSIS — E23.0 PANHYPOPITUITARISM: Primary | ICD-10-CM

## 2021-05-04 PROCEDURE — 99214 PR OFFICE/OUTPT VISIT, EST, LEVL IV, 30-39 MIN: ICD-10-PCS | Mod: 95,,, | Performed by: GENERAL ACUTE CARE HOSPITAL

## 2021-05-04 PROCEDURE — 99214 OFFICE O/P EST MOD 30 MIN: CPT | Mod: 95,,, | Performed by: GENERAL ACUTE CARE HOSPITAL

## 2021-05-04 RX ORDER — HYDROCORTISONE 5 MG/1
TABLET ORAL
Qty: 100 TABLET | Refills: 11 | Status: SHIPPED | OUTPATIENT
Start: 2021-05-04

## 2021-05-04 RX ORDER — ALBUTEROL SULFATE 90 UG/1
1-2 AEROSOL, METERED RESPIRATORY (INHALATION)
Qty: 18 G | Refills: 3 | Status: SHIPPED | OUTPATIENT
Start: 2021-05-04 | End: 2022-05-04

## 2021-05-04 RX ORDER — ACETAMINOPHEN 500 MG
1 TABLET ORAL DAILY
Start: 2021-05-04

## 2021-05-04 RX ORDER — TESTOSTERONE 20.25 MG/1.25G
GEL TOPICAL
Qty: 30 PACKET | Refills: 5 | Status: SHIPPED | OUTPATIENT
Start: 2021-05-04 | End: 2021-08-23 | Stop reason: SDUPTHER

## 2021-05-04 RX ORDER — SOMATROPIN 10 MG/1.5ML
INJECTION, SOLUTION SUBCUTANEOUS
Qty: 12 ML | Refills: 5 | OUTPATIENT
Start: 2021-05-04 | End: 2021-11-22 | Stop reason: SDUPTHER

## 2021-05-04 RX ORDER — LEVOTHYROXINE SODIUM 112 UG/1
112 TABLET ORAL
Qty: 30 TABLET | Refills: 11 | Status: SHIPPED | OUTPATIENT
Start: 2021-05-04 | End: 2022-05-04

## 2021-05-06 DIAGNOSIS — E34.30 SHORT STATURE DUE TO ENDOCRINE DISORDER: ICD-10-CM

## 2021-05-06 DIAGNOSIS — E23.0 PANHYPOPITUITARISM: ICD-10-CM

## 2021-05-06 RX ORDER — SOMATROPIN 10 MG/1.5ML
INJECTION, SOLUTION SUBCUTANEOUS
Qty: 12 ML | Refills: 5 | Status: SHIPPED | OUTPATIENT
Start: 2021-05-06 | End: 2021-11-13 | Stop reason: SDUPTHER

## 2021-05-06 RX ORDER — SOMATROPIN 10 MG/1.5ML
INJECTION, SOLUTION SUBCUTANEOUS
Qty: 12 ML | Refills: 5 | Status: CANCELLED | OUTPATIENT
Start: 2021-05-06

## 2021-05-10 ENCOUNTER — SPECIALTY PHARMACY (OUTPATIENT)
Dept: PHARMACY | Facility: CLINIC | Age: 26
End: 2021-05-10

## 2021-06-02 ENCOUNTER — SPECIALTY PHARMACY (OUTPATIENT)
Dept: PHARMACY | Facility: CLINIC | Age: 26
End: 2021-06-02

## 2021-06-30 ENCOUNTER — PATIENT MESSAGE (OUTPATIENT)
Dept: PHARMACY | Facility: CLINIC | Age: 26
End: 2021-06-30

## 2021-07-06 ENCOUNTER — SPECIALTY PHARMACY (OUTPATIENT)
Dept: PHARMACY | Facility: CLINIC | Age: 26
End: 2021-07-06

## 2021-07-09 ENCOUNTER — SPECIALTY PHARMACY (OUTPATIENT)
Dept: PHARMACY | Facility: CLINIC | Age: 26
End: 2021-07-09

## 2021-08-04 ENCOUNTER — SPECIALTY PHARMACY (OUTPATIENT)
Dept: PHARMACY | Facility: CLINIC | Age: 26
End: 2021-08-04

## 2021-08-06 ENCOUNTER — PATIENT MESSAGE (OUTPATIENT)
Dept: PHARMACY | Facility: CLINIC | Age: 26
End: 2021-08-06

## 2021-08-23 DIAGNOSIS — E23.0 PANHYPOPITUITARISM: ICD-10-CM

## 2021-08-23 RX ORDER — TESTOSTERONE 20.25 MG/1.25G
GEL TOPICAL
Qty: 30 PACKET | Refills: 5 | Status: SHIPPED | OUTPATIENT
Start: 2021-08-23

## 2021-08-26 ENCOUNTER — TELEPHONE (OUTPATIENT)
Dept: PHARMACY | Facility: CLINIC | Age: 26
End: 2021-08-26

## 2021-08-26 ENCOUNTER — SPECIALTY PHARMACY (OUTPATIENT)
Dept: PHARMACY | Facility: CLINIC | Age: 26
End: 2021-08-26

## 2021-09-21 ENCOUNTER — SPECIALTY PHARMACY (OUTPATIENT)
Dept: PHARMACY | Facility: CLINIC | Age: 26
End: 2021-09-21

## 2021-10-18 ENCOUNTER — SPECIALTY PHARMACY (OUTPATIENT)
Dept: PHARMACY | Facility: CLINIC | Age: 26
End: 2021-10-18

## 2021-11-13 ENCOUNTER — PATIENT MESSAGE (OUTPATIENT)
Dept: PHARMACY | Facility: CLINIC | Age: 26
End: 2021-11-13
Payer: COMMERCIAL

## 2021-11-13 DIAGNOSIS — E34.30 SHORT STATURE DUE TO ENDOCRINE DISORDER: ICD-10-CM

## 2021-11-13 DIAGNOSIS — E23.0 PANHYPOPITUITARISM: ICD-10-CM

## 2021-11-14 RX ORDER — SOMATROPIN 10 MG/1.5ML
INJECTION, SOLUTION SUBCUTANEOUS
Qty: 12 ML | Refills: 5 | Status: CANCELLED | OUTPATIENT
Start: 2021-11-14

## 2021-11-15 DIAGNOSIS — E23.0 PANHYPOPITUITARISM: ICD-10-CM

## 2021-11-15 DIAGNOSIS — E34.30 SHORT STATURE DUE TO ENDOCRINE DISORDER: ICD-10-CM

## 2021-11-15 RX ORDER — SOMATROPIN 10 MG/1.5ML
INJECTION, SOLUTION SUBCUTANEOUS
Qty: 12 ML | Refills: 5 | Status: SHIPPED | OUTPATIENT
Start: 2021-11-15

## 2021-11-22 ENCOUNTER — SPECIALTY PHARMACY (OUTPATIENT)
Dept: PHARMACY | Facility: CLINIC | Age: 26
End: 2021-11-22
Payer: COMMERCIAL

## 2022-02-22 ENCOUNTER — PATIENT MESSAGE (OUTPATIENT)
Dept: RESEARCH | Facility: HOSPITAL | Age: 27
End: 2022-02-22
Payer: COMMERCIAL

## 2022-06-17 NOTE — TELEPHONE ENCOUNTER
Refill call for norditropin. Patient's mother confirmed they are in need of a refill. Will ship out 2/14 to arrive 2/15 with patient consent  
Him/He

## 2023-02-03 ENCOUNTER — OFFICE VISIT (OUTPATIENT)
Dept: URGENT CARE | Facility: CLINIC | Age: 28
End: 2023-02-03

## 2023-02-03 VITALS
BODY MASS INDEX: 25.61 KG/M2 | HEIGHT: 64 IN | SYSTOLIC BLOOD PRESSURE: 115 MMHG | TEMPERATURE: 99 F | HEART RATE: 77 BPM | DIASTOLIC BLOOD PRESSURE: 72 MMHG | WEIGHT: 150 LBS | RESPIRATION RATE: 18 BRPM | OXYGEN SATURATION: 96 %

## 2023-02-03 DIAGNOSIS — R05.8 COUGH WITH CONGESTION OF PARANASAL SINUS: ICD-10-CM

## 2023-02-03 DIAGNOSIS — Z11.52 ENCOUNTER FOR SCREENING FOR COVID-19: ICD-10-CM

## 2023-02-03 DIAGNOSIS — J06.9 VIRAL URI WITH COUGH: Primary | ICD-10-CM

## 2023-02-03 DIAGNOSIS — R09.81 COUGH WITH CONGESTION OF PARANASAL SINUS: ICD-10-CM

## 2023-02-03 DIAGNOSIS — J45.21 MILD INTERMITTENT ASTHMA WITH EXACERBATION: ICD-10-CM

## 2023-02-03 LAB
CTP QC/QA: YES
SARS-COV-2 AG RESP QL IA.RAPID: NEGATIVE

## 2023-02-03 PROCEDURE — 87811 SARS-COV-2 COVID19 W/OPTIC: CPT | Mod: QW,S$GLB,, | Performed by: PHYSICIAN ASSISTANT

## 2023-02-03 PROCEDURE — 99203 PR OFFICE/OUTPT VISIT, NEW, LEVL III, 30-44 MIN: ICD-10-PCS | Mod: S$GLB,,, | Performed by: PHYSICIAN ASSISTANT

## 2023-02-03 PROCEDURE — 87811 SARS CORONAVIRUS 2 ANTIGEN POCT, MANUAL READ: ICD-10-PCS | Mod: QW,S$GLB,, | Performed by: PHYSICIAN ASSISTANT

## 2023-02-03 PROCEDURE — 99203 OFFICE O/P NEW LOW 30 MIN: CPT | Mod: S$GLB,,, | Performed by: PHYSICIAN ASSISTANT

## 2023-02-03 RX ORDER — BROMPHENIRAMINE MALEATE, PSEUDOEPHEDRINE HYDROCHLORIDE, AND DEXTROMETHORPHAN HYDROBROMIDE 2; 30; 10 MG/5ML; MG/5ML; MG/5ML
5 SYRUP ORAL
Qty: 220 ML | Refills: 0 | Status: SHIPPED | OUTPATIENT
Start: 2023-02-03 | End: 2023-02-13

## 2023-02-03 RX ORDER — BENZONATATE 200 MG/1
200 CAPSULE ORAL 3 TIMES DAILY PRN
Qty: 30 CAPSULE | Refills: 0 | Status: SHIPPED | OUTPATIENT
Start: 2023-02-03 | End: 2023-02-13

## 2023-02-03 RX ORDER — ALBUTEROL SULFATE 90 UG/1
2 AEROSOL, METERED RESPIRATORY (INHALATION) EVERY 6 HOURS PRN
Qty: 18 G | Refills: 0 | Status: SHIPPED | OUTPATIENT
Start: 2023-02-03 | End: 2023-10-30 | Stop reason: SDUPTHER

## 2023-02-03 RX ORDER — ALBUTEROL SULFATE 0.83 MG/ML
2.5 SOLUTION RESPIRATORY (INHALATION) EVERY 6 HOURS PRN
Qty: 83 ML | Refills: 0 | Status: SHIPPED | OUTPATIENT
Start: 2023-02-03 | End: 2024-02-03

## 2023-02-03 NOTE — PROGRESS NOTES
"Subjective:       Patient ID: Milton Berkowitz is a 27 y.o. male.    Vitals:  height is 5' 4" (1.626 m) and weight is 68 kg (150 lb). His temperature is 98.5 °F (36.9 °C). His blood pressure is 115/72 and his pulse is 77. His respiration is 18 and oxygen saturation is 96%.     Chief Complaint: Nasal Congestion (Entered by patient)      27-year-old male with a history of asthma, hypothyroidism, growth hormone deficiency, and allergies who presents to urgent care clinic for evaluation.  Already vaccinated for COVID-19.  Mom has upper respiratory virus infection as home as well.  Symptoms started 3 days ago.  Has dry cough, runny/stuffy nose, wheezing, chest congestion, wheezing, postnasal drip.  Has taken 2 doses of Mucinex and 1 dose of Excedrin in the last 3 days.  He has been using his albuterol nebulizer machine 1-2 times a day for wheezing with good improvements.  No other associated symptoms.  Requesting refill for albuterol inhaler and nebulizer.      Cough  This is a new problem. The current episode started in the past 7 days (tuesday). The problem has been unchanged. The problem occurs constantly. The cough is Productive of sputum. Associated symptoms include headaches, nasal congestion, postnasal drip, rhinorrhea, shortness of breath and wheezing. Pertinent negatives include no chest pain, chills, ear congestion, ear pain, eye redness, fever, heartburn, hemoptysis, myalgias, rash, sore throat, sweats or weight loss. He has tried a beta-agonist inhaler (mucinex,excedrin) for the symptoms. The treatment provided mild relief. His past medical history is significant for asthma. There is no history of bronchiectasis, bronchitis, COPD, emphysema, environmental allergies or pneumonia.     Constitution: Negative for activity change, chills, sweating, fatigue, fever and generalized weakness.   HENT:  Positive for congestion, postnasal drip and sinus pressure. Negative for ear pain, hearing loss, facial swelling, sinus " pain, sore throat, trouble swallowing and voice change.    Neck: Negative for neck pain, neck stiffness and painful lymph nodes.   Cardiovascular:  Negative for chest pain, leg swelling, palpitations, sob on exertion and passing out.   Eyes:  Negative for eye discharge, eye pain, eye redness, photophobia, vision loss, double vision, blurred vision and eyelid swelling.   Respiratory:  Positive for cough, shortness of breath, wheezing and asthma. Negative for chest tightness, sputum production, bloody sputum and COPD.    Gastrointestinal:  Negative for abdominal pain, nausea, vomiting, diarrhea, bright red blood in stool, dark colored stools, rectal bleeding, heartburn and bowel incontinence.   Genitourinary:  Negative for dysuria, frequency, urgency, urine decreased, flank pain, bladder incontinence, hematuria and history of kidney stones.   Musculoskeletal:  Negative for trauma, joint pain, joint swelling, abnormal ROM of joint, muscle cramps and muscle ache.   Skin:  Negative for color change, pale, rash and wound.   Allergic/Immunologic: Positive for asthma. Negative for environmental allergies, seasonal allergies and immunocompromised state.   Neurological:  Positive for headaches. Negative for dizziness, history of vertigo, light-headedness, passing out, facial drooping, speech difficulty, coordination disturbances, loss of balance, disorientation, altered mental status, loss of consciousness, numbness, tingling and seizures.   Hematologic/Lymphatic: Negative for swollen lymph nodes, easy bruising/bleeding and trouble clotting. Does not bruise/bleed easily.   Psychiatric/Behavioral:  Negative for altered mental status and disorientation.        Past Medical History:   Diagnosis Date    Allergy     seasonal    Asthma     GHD (growth hormone deficiency) 7/14/2017    Growth hormone deficiency     HGHG (hypogonadotropic hypogonadism) 11/22/2017    Hypothyroidism     Hypothyroidism 11/22/2017    Short stature due to  endocrine disorder 7/16/2017       Objective:      Physical Exam   Constitutional: He is oriented to person, place, and time. He appears well-developed. He is cooperative.  Non-toxic appearance. He does not appear ill. No distress.      Comments:Well-appearing.  Speaking full sentences without difficulty.     HENT:   Head: Normocephalic and atraumatic.   Ears:   Right Ear: Hearing, external ear and ear canal normal. No no drainage, swelling or tenderness.   Left Ear: Hearing, external ear and ear canal normal. No no drainage, swelling or tenderness.      Comments: Mild bilateral middle ear effusion with no bulging or erythema  Nose: Rhinorrhea present. No purulent discharge. Right sinus exhibits no maxillary sinus tenderness and no frontal sinus tenderness. Left sinus exhibits no maxillary sinus tenderness and no frontal sinus tenderness.   Mouth/Throat: Uvula is midline, oropharynx is clear and moist and mucous membranes are normal. Mucous membranes are moist. No oral lesions. No trismus in the jaw. No uvula swelling. No oropharyngeal exudate, posterior oropharyngeal edema or posterior oropharyngeal erythema. No tonsillar exudate. Oropharynx is clear.       Eyes: Conjunctivae, EOM and lids are normal. Pupils are equal, round, and reactive to light. No visual field deficit is present. Right eye exhibits no discharge. Left eye exhibits no discharge. Right conjunctiva is not injected. Right conjunctiva has no hemorrhage. Left conjunctiva is not injected. Left conjunctiva has no hemorrhage. Extraocular movement intact vision grossly intact gaze aligned appropriately   Neck: Neck supple. No neck rigidity present.   Cardiovascular: Normal rate, regular rhythm, normal heart sounds and normal pulses.   No murmur heard.  Pulmonary/Chest: Effort normal. No accessory muscle usage or stridor. No respiratory distress. He has wheezes. He exhibits no tenderness.         Comments: Mild expiratory wheezing on upper lung fields on  auscultation.    Abdominal: Normal appearance. He exhibits no distension and no mass. Soft. There is no abdominal tenderness. There is no rebound and no guarding.   Musculoskeletal: Normal range of motion.         General: Normal range of motion.      Cervical back: He exhibits no tenderness.      Right lower leg: No edema.      Left lower leg: No edema.      Comments: Moves all extremities with normal tone, strength, and ROM.  Gait normal.   Lymphadenopathy:     He has no cervical adenopathy.   Neurological: no focal deficit. He is alert, oriented to person, place, and time and at baseline. He has normal motor skills and normal sensation. He displays no weakness, facial symmetry, normal reflexes and no dysarthria. No cranial nerve deficit or sensory deficit. He exhibits normal muscle tone. He has a normal Finger-Nose-Finger Test. Coordination: Heel to shin test normal. He shows no pronator drift. He displays no seizure activity. Gait and coordination normal. Coordination normal. GCS eye subscore is 4. GCS verbal subscore is 5. GCS motor subscore is 6.   Skin: Skin is warm, dry, not diaphoretic and no rash. Capillary refill takes less than 2 seconds.   Psychiatric: His speech is normal and behavior is normal. Thought content normal.   Nursing note and vitals reviewed.        Results for orders placed or performed in visit on 02/03/23   SARS Coronavirus 2 Antigen, POCT Manual Read   Result Value Ref Range    SARS Coronavirus 2 Antigen Negative Negative     Acceptable Yes      Contains abnormal data Comprehensive metabolic panel  Order: [id number]   Ref Range & Units 1 yr ago   Albumin 3.5 - 5.2 g/dl 3.9    Potassium 3.5 - 5.1 mmol/L 3.1 Low     Osmolality Calc 253 - 306 mOsm 280    BUN 7 - 21 mg/dL 6 Low     Alkaline Phosphatase 32 - 138 u/l 84    ALT 10 - 50 u/l 13    Sodium 136 - 145 mmol/L 141    CO2 24 - 34 mmol/L 20 Low     BUN/Creatinine Ratio 10 - 20 7 Low     Glucose 70 - 105 mg/dL 124 High      Total Bilirubin 0.4 - 1.6 mg/dL 0.7    Total Protein 5.8 - 7.9 GM/DL 6.7    Chloride 98 - 108 BRENDA/L 109 High     Anion Gap 5 - 15 12    Glomerular Filtration Rate >=60 138    Comment: Reference Range for estimate of GFR:=>90mL/min/1.73 sqm. If the estimated GFR is >60 mL/min/1.73 sqm, the result may not be accurate due to limitations of the MDRD equation in this range.   Creatinine 0.70 - 1.50 mg/dL 0.83    Calcium 8.8 - 10.5 mg/dL 8.9    AST 12 - 40 u/l 28    Resulting [facility]   Specimen Collected: 02/23/21 06:39 Last Resulted: 02/23/21 06:58     Assessment:       1. Viral URI with cough    2. Cough with congestion of paranasal sinus    3. Mild intermittent asthma with exacerbation    4. Encounter for screening for COVID-19        Note dictated with voice recognition software, please excuse any grammatical errors.    Nontoxic appearing. Vitals are stable. Patient has symptoms at this time which is consistent with above diagnosis.    Clinic orders: Rapid covid negative.   All diagnostic testing personally reviewed and interpreted.   CMP 02/23/2021 at outside facility grossly stable with good kidney and liver functions.  Defer to PCP for medical management.    Offered breathing treatment in clinic given his mild wheezing but he declined since he is self-pay.  Patient was recommended OTC treatments for their symptoms.   Patient was also prescribed medications for their symptoms.     Emphasized the importance of OTC symptomatic treatment for improvements in symptoms and prevent further worsening of conditioning.  Recommend oral antihistamine q.a.m., antihistamine nasal spray 1-2 times per day, Mucinex DM Q 12 hour, and Sudafed q.4-6 hours in addition to prescribed medication.       Patient has acute exacerbation of chronic illness. We did discuss the risk of morbidity without treatment is significant.     Patient was counseled, explained with the test results meaning, expected course, and  answered all of questions. They can also receive results via my chart.  Printed and verbal treatment guidelines/recommendations were given.   Recommend follow-up PCP in the next 2-3 days if new or worsening symptoms.    Patient understands that they received an Urgent Care treatment only and that they may be released before all your medical problems are known or treated. Strict ED versus clinic precautions given.  Patient verbalized understanding and agreed with plan of care.    Note dictated with voice recognition software, please excuse any grammatical errors.      Plan:         Viral URI with cough  -     brompheniramine-pseudoeph-DM (BROMFED DM) 2-30-10 mg/5 mL Syrp; Take 5 mLs by mouth every 4 to 6 hours as needed (Cough and nasal sinus congestion).  Dispense: 220 mL; Refill: 0  -     benzonatate (TESSALON) 200 MG capsule; Take 1 capsule (200 mg total) by mouth 3 (three) times daily as needed for Cough.  Dispense: 30 capsule; Refill: 0  -     albuterol (VENTOLIN HFA) 90 mcg/actuation inhaler; Inhale 2 puffs into the lungs every 6 (six) hours as needed for Wheezing. Rescue  Dispense: 18 g; Refill: 0  -     albuterol (PROVENTIL) 2.5 mg /3 mL (0.083 %) nebulizer solution; Take 3 mLs (2.5 mg total) by nebulization every 6 (six) hours as needed for Wheezing or Shortness of Breath. Rescue  Dispense: 83 mL; Refill: 0    Cough with congestion of paranasal sinus  -     Cancel: SARS Coronavirus 2 Antigen, POCT Manual Read  -     SARS Coronavirus 2 Antigen, POCT Manual Read  -     brompheniramine-pseudoeph-DM (BROMFED DM) 2-30-10 mg/5 mL Syrp; Take 5 mLs by mouth every 4 to 6 hours as needed (Cough and nasal sinus congestion).  Dispense: 220 mL; Refill: 0  -     benzonatate (TESSALON) 200 MG capsule; Take 1 capsule (200 mg total) by mouth 3 (three) times daily as needed for Cough.  Dispense: 30 capsule; Refill: 0    Mild intermittent asthma with exacerbation  -     albuterol (VENTOLIN HFA) 90 mcg/actuation inhaler; Inhale  2 puffs into the lungs every 6 (six) hours as needed for Wheezing. Rescue  Dispense: 18 g; Refill: 0  -     albuterol (PROVENTIL) 2.5 mg /3 mL (0.083 %) nebulizer solution; Take 3 mLs (2.5 mg total) by nebulization every 6 (six) hours as needed for Wheezing or Shortness of Breath. Rescue  Dispense: 83 mL; Refill: 0    Encounter for screening for COVID-19              Additional MDM:     Heart Failure Score:   COPD = No    Patient Instructions     PLEASE READ YOUR DISCHARGE INSTRUCTIONS ENTIRELY AS IT CONTAINS IMPORTANT INFORMATION.    Patient had covid testing done today.    Discussed corona virus precautions and reviewed [facility] FAC; printed a copy for patient.  I discussed to continue to monitor their symptoms. Discussed that if their symptoms persist or worsen to seek re-evaluation. Clinic vs. ER precautions were given.  Patient verbalized understanding and agreed with the entire plan of care.    If Negative and no direct exposure: symptom free without fever reducing meds in 24 hours - can go back to work in 24 hours with surgical mask for 10-14 days.    - Reviewed radiographs and all diagnostic testing with patient/family.    - Rest.  Drink plenty of fluids.    - Tylenol OR anti-inflammatory (NSAIDs, ibuprofen, aleve, motrin) as directed as needed for fever/pain.  For Tylenol, do not exceed 3000 mg/ day. If no contraindication or allergies.  -OK to supplement with OTC DayQuil, NyQuil or TheraFlu every 6 hours as needed for cough and congestion.  Use caution of total amount of Tylenol/acetaminophen per day.    - continue albuterol inhaler as needed for shortness of breath/wheezing  - take Tessalon as needed for cough suppression.   -Take Bromfed cough syrup as needed for cough every 4-6 hours.  If insurance is not covered , please see active ingredients below and supplement.      -Below are suggestions for symptomatic relief:              -Salt water gargles to soothe throat pain.              -Chloroseptic spray  also helps to numb throat pain. Drink hot tea with honey or lemon to soothe your throat.              -Nasal saline spray reduces inflammation and dryness.              -Warm face compresses to help with facial sinus pain/pressure.              -Vicks vapor rub at night.                **may also supplement with OTC nasal spray to help with inflammation and congestion.   Wean to off when you nose becomes to dry or bleed. Also use nasal saline twice a day to help with dryness.               -Flonase OTC or Nasacort OTC  once or twice a day for nasal/sinus congestion. DON'T USE IF YOU HAVE GLAUCOMA. CHECK WITH YOUR PHARMACIST/PHYSICIAN.              -Simple foods like chicken noodle soup.              -Mucinex DM (ANY COUGH EXPECTORANT-- guaifenesin) for cough or chest congestion with mucus and (ANY COUGH SUPPRESSANT- dextromethorphan) helps with coughing every 12 hours. Mucinex-DM if you have chest congestion or sputum (caution if history of high blood pressure or palpitations).              -Zyrtec/Claritin/xyzal during the day time  & Benadryl at night (only if severe runny nose) may help with allergies and runny nose. Add decongestant if you have nasal/sinus congestion/sinus pressure/ear fullness sensation. (see below)              -may take OTC meclizine as needed for dizziness or nausea.     Caution with use of Decongestant meds:  -If you DO NOT have Hypertension or any history of palpitations, it is ok to take over the counter Sudafed or Mucinex D or Allegra-D or Claritin-D or Zyrtec-D.  -If you do take one of the above, it is ok to combine that with plain over the counter Mucinex or Allegra or Claritin or Zyrtec. If, for example, you are taking Zyrtec -D, you can combine that with Mucinex, but not Mucinex-D.  If you are taking Mucinex-D, you can combine that with plain Allegra or Claritin or Zyrtec.     -Do not combine pseudophed or phenylephrine with any other brand allergy-D for DECONGESTANT.   -Or vice  versa, you can you take plain allergy medications (allegra/claritin/zyrtec with NO Decongestant) and ADD OTC pseudophed or phenelyphrine 3 times a day (or every 4-6 hours needed). Avoid taking decongestant late at night or with caffeine as it can keep you up or cause jittery feeling.     -If you DO have Hypertension , anxiety, or palpitations, it is safe to take Coricidin HBP for relief of cough, congestion, or sinus symptoms every 4-6 hours.      For your GI symptoms:  -Use gatorade/pedialyte or rehydration packets to help stay hydrated. Vitamin water and plain water do not contain rehydrating electrolytes.  -Increase clear liquids (water, gatorade, pedialyte, broths, jello, etc) Hold off on solids for 12-18 hours. Then advance to BRAT diet (banana, rice, applesauce, tea, toast/crackers), then advance further as tolerated. Avoid spicy or fatty foods.       -Please go to the ER if you experience worsening abdominal pain, blood in your vomit or stool, high fever, dizziness, fainting, swelling of your abdomen, inability to pass gas or stool, or inability to urinate.     -You must understand that you've received an Urgent Care treatment only and that you may be released before all your medical problems are known or treated. You, the patient, will arrange for follow up care as instructed. Please arrange follow up with your primary medical clinic within 2-5 days if your signs and symptoms have not resolved or worsen.     - Follow up with your PCP or specialty clinic as directed.  You can call [phone] or [phone] to schedule an appointment with the appropriate provider.  Schedule CENTER is open Mon-Friday 8-5pm (excluded holidays).    - If your condition worsens or fails to improve we recommend that you receive another evaluation at the emergency room immediately or contact your primary medical clinic to discuss your concerns.            Prevention steps for patients with confirmed or suspected COVID-19  Stay  home and stay away from family members and friends. The CDC says, you can leave home after these three things have happened: 1) You have had no fever for at least 24 hours (that is one full day of no fever without the use of medicine that reduces fevers) 2) AND other symptoms have improved (for example, when your cough or shortness of breath have improved) 3) AND at least 10 days have passed since your symptoms first appeared OR after 6-10 days passed from first positive test (with mask on till day #11).  Separate yourself from other people and animals in your home.  Call ahead before visiting your doctor.  Wear a facemask.  Cover your coughs and sneezes.  Wash your hands often with soap and water; hand  can be used, too.  Avoid sharing personal household items.  Wipe down surfaces used daily.  Monitor your symptoms. Seek prompt medical attention if your illness is worsening (e.g., difficulty breathing).   Before seeking care, call your healthcare provider.  If you have a medical emergency and need to call 911, notify the dispatch personnel that you have, or are being evaluated for COVID-19. If possible, put on a facemask before emergency medical services arrive.      Recommended precautions for household members, intimate partners, and caregivers in a home setting of a patient with symptomatic laboratory-confirmed COVID-19 or a patient under investigation.  Household members, intimate partners, and caregivers in the home setting awaiting tests results have close contact with a person with symptomatic, laboratory-confirmed COVID-19 or a person under investigation. Close contacts should monitor their health; they should call their provider right away if they develop symptoms suggestive of COVID-19 (e.g., fever, cough, shortness of breath).    Close contacts should also follow these recommendations:  Make sure that you understand and can help the patient follow their provider's instructions for medication(s)  and care. You should help the patient with basic needs in the home and provide support for getting groceries, prescriptions, and other personal needs.  Monitor the patient's symptoms. If the patient is getting sicker, call his or her healthcare provider and tell them that the patient has laboratory-confirmed COVID-19. If the patient has a medical emergency and you need to call 911, notify the dispatch personnel that the patient has, or is being evaluated for COVID-19.  Household members should stay in another room or be  from the patient. Household members should use a separate bedroom and bathroom, if available.  Prohibit visitors.  Household members should care for any pets in the home.  Make sure that shared spaces in the home have good air flow, such as by an air conditioner or an opened window, weather permitting.  Perform hand hygiene frequently. Wash your hands often with soap and water for at least 20 seconds or use an alcohol-based hand  (that contains > 60% alcohol) covering all surfaces of your hands and rubbing them together until they feel dry. Soap and water should be used preferentially.  Avoid touching your eyes, nose, and mouth.  The patient should wear a facemask. If the patient is not able to wear a facemask (for example, because it causes trouble breathing), caregivers should wear a mask when they are in the same room as the patient.  Wear a disposable facemask and gloves when you touch or have contact with the patient's blood, stool, or body fluids, such as saliva, sputum, nasal mucus, vomit, urine.  Throw out disposable facemasks and gloves after using them. Do not reuse.  When removing personal protective equipment, first remove and dispose of gloves. Then, immediately clean your hands with soap and water or alcohol-based hand . Next, remove and dispose of facemask, and immediately clean your hands again with soap and water or alcohol-based hand .  You  should not share dishes, drinking glasses, cups, eating utensils, towels, bedding, or other items with the patient. After the patient uses these items, you should wash them thoroughly (see below Wash laundry thoroughly).  Clean all high-touch surfaces, such as counters, tabletops, doorknobs, bathroom fixtures, toilets, phones, keyboards, tablets, and bedside tables, every day. Also, clean any surfaces that may have blood, stool, or body fluids on them.  Use a household cleaning spray or wipe, according to the label instructions. Labels contain instructions for safe and effective use of the cleaning product including precautions you should take when applying the product, such as wearing gloves and making sure you have good ventilation during use of the product.  Wash laundry thoroughly.  Immediately remove and wash clothes or bedding that have blood, stool, or body fluids on them.  Wear disposable gloves while handling soiled items and keep soiled items away from your body. Clean your hands (with soap and water or an alcohol-based hand ) immediately after removing your gloves.  Read and follow directions on labels of laundry or clothing items and detergent. In general, using a normal laundry detergent according to washing machine instructions and dry thoroughly using the warmest temperatures recommended on the clothing label.  Place all used disposable gloves, facemasks, and other contaminated items in a lined container before disposing of them with other household waste. Clean your hands (with soap and water or an alcohol-based hand ) immediately after handling these items. Soap and water should be used preferentially if hands are visibly dirty.  Discuss any additional questions with your state or local health department or healthcare provider. Check available hours when contacting your local health department.    For more information see CDC link below.       https://www.cdc.gov/coronavirus/2019-ncov/hcp/guidance-prevent-spread.html#precautions        Sources:  [facility], [place] [facility] and [facility]          Instructions for Home Care of Patients and Caretakers with Coronavirus Disease 2019  Limit visitors to the home.  Older persons and those that have chronic medical conditions such as diabetes, lung and heart disease are at increased risk for illness.   If possible, patients should use a separate bedroom while recovering. Caregivers and household members should avoid prolonged contact with the patient which means to stay 6 feet away and avoid contact with cough droplets.  When close contact is necessary, wash your hands before and immediately after contact.   Perform hand hygiene frequently. Wash your hands often with soap and water for at least 20 seconds or use an alcohol-based hand , covering all surfaces of your hands and rubbing them together until they feel dry.   Avoid touching your eyes, nose, and mouth with unwashed hands.  Avoid sharing household items with the patient. You should not share dishes, drinking glasses, cups, eating utensils, towels, bedding, or other items. After the patient uses these items, you should wash them thoroughly.  Wash laundry thoroughly.   Immediately remove and wash clothes or bedding that have blood, stool, or body fluids on them.  Clean all high-touch surfaces, such as counters, tabletops, doorknobs, bathroom fixtures, toilets, phones, keyboards, tablets, and bedside tables, every day.   Use a household cleaning spray or wipe, according to the label instructions. Labels contain instructions for safe and effective use of the cleaning product including precautions you should take when applying the product, such as wearing gloves and making sure you have good ventilation during use of the product.    For more information see CDC link below.       https://www.cdc.gov/coronavirus/2019-ncov/hcp/guidance-prevent-spread.html#precautions               If your symptoms worsen or if you have any other concerns, please contact [facility] at [phone].

## 2023-02-03 NOTE — LETTER
[address] ? [place], [address] ? Phone [phone] ? Fax [phone]           Return to Work/School    Patient: Milton Berkowitz  YOB: 1995   Date: 02/03/2023      To Whom It May Concern:     Milton Berkowitz was in contact with/seen in my office on 02/03/2023. COVID-19 is present in our communities across the state. Not all patients are eligible or appropriate to be tested. In this situation, your employee meets the following criteria:     Milton Berkowitz has met the criteria for COVID-19 testing and has a NEGATIVE result. The employee can return to work once they are asymptomatic for 24 hours without the use of fever reducing medications (Tylenol, Motrin, etc). May return to school and work on 02/03/2023 or 2/42023 pending the above.     If you have any questions or concerns, or if I can be of further assistance, please do not hesitate to contact me.     Sincerely,    Ryan Rose PA-C

## 2023-02-03 NOTE — PATIENT INSTRUCTIONS
PLEASE READ YOUR DISCHARGE INSTRUCTIONS ENTIRELY AS IT CONTAINS IMPORTANT INFORMATION.    Patient had covid testing done today.    Discussed corona virus precautions and reviewed [facility] FAC; printed a copy for patient.  I discussed to continue to monitor their symptoms. Discussed that if their symptoms persist or worsen to seek re-evaluation. Clinic vs. ER precautions were given.  Patient verbalized understanding and agreed with the entire plan of care.    If Negative and no direct exposure: symptom free without fever reducing meds in 24 hours - can go back to work in 24 hours with surgical mask for 10-14 days.    - Reviewed radiographs and all diagnostic testing with patient/family.    - Rest.  Drink plenty of fluids.    - Tylenol OR anti-inflammatory (NSAIDs, ibuprofen, aleve, motrin) as directed as needed for fever/pain.  For Tylenol, do not exceed 3000 mg/ day. If no contraindication or allergies.  -OK to supplement with OTC DayQuil, NyQuil or TheraFlu every 6 hours as needed for cough and congestion.  Use caution of total amount of Tylenol/acetaminophen per day.    - continue albuterol inhaler as needed for shortness of breath/wheezing  - take Tessalon as needed for cough suppression.   -Take Bromfed cough syrup as needed for cough every 4-6 hours.  If insurance is not covered , please see active ingredients below and supplement.      -Below are suggestions for symptomatic relief:              -Salt water gargles to soothe throat pain.              -Chloroseptic spray also helps to numb throat pain. Drink hot tea with honey or lemon to soothe your throat.              -Nasal saline spray reduces inflammation and dryness.              -Warm face compresses to help with facial sinus pain/pressure.              -Vicks vapor rub at night.                **may also supplement with OTC nasal spray to help with inflammation and congestion.   Wean to off when you nose becomes to dry or bleed. Also use nasal saline twice  a day to help with dryness.               -Flonase OTC or Nasacort OTC  once or twice a day for nasal/sinus congestion. DON'T USE IF YOU HAVE GLAUCOMA. CHECK WITH YOUR PHARMACIST/PHYSICIAN.              -Simple foods like chicken noodle soup.              -Mucinex DM (ANY COUGH EXPECTORANT-- guaifenesin) for cough or chest congestion with mucus and (ANY COUGH SUPPRESSANT- dextromethorphan) helps with coughing every 12 hours. Mucinex-DM if you have chest congestion or sputum (caution if history of high blood pressure or palpitations).              -Zyrtec/Claritin/xyzal during the day time  & Benadryl at night (only if severe runny nose) may help with allergies and runny nose. Add decongestant if you have nasal/sinus congestion/sinus pressure/ear fullness sensation. (see below)              -may take OTC meclizine as needed for dizziness or nausea.     Caution with use of Decongestant meds:  -If you DO NOT have Hypertension or any history of palpitations, it is ok to take over the counter Sudafed or Mucinex D or Allegra-D or Claritin-D or Zyrtec-D.  -If you do take one of the above, it is ok to combine that with plain over the counter Mucinex or Allegra or Claritin or Zyrtec. If, for example, you are taking Zyrtec -D, you can combine that with Mucinex, but not Mucinex-D.  If you are taking Mucinex-D, you can combine that with plain Allegra or Claritin or Zyrtec.     -Do not combine pseudophed or phenylephrine with any other brand allergy-D for DECONGESTANT.   -Or vice versa, you can you take plain allergy medications (allegra/claritin/zyrtec with NO Decongestant) and ADD OTC pseudophed or phenelyphrine 3 times a day (or every 4-6 hours needed). Avoid taking decongestant late at night or with caffeine as it can keep you up or cause jittery feeling.     -If you DO have Hypertension , anxiety, or palpitations, it is safe to take Coricidin HBP for relief of cough, congestion, or sinus symptoms every 4-6 hours.      For  your GI symptoms:  -Use gatorade/pedialyte or rehydration packets to help stay hydrated. Vitamin water and plain water do not contain rehydrating electrolytes.  -Increase clear liquids (water, gatorade, pedialyte, broths, jello, etc) Hold off on solids for 12-18 hours. Then advance to BRAT diet (banana, rice, applesauce, tea, toast/crackers), then advance further as tolerated. Avoid spicy or fatty foods.       -Please go to the ER if you experience worsening abdominal pain, blood in your vomit or stool, high fever, dizziness, fainting, swelling of your abdomen, inability to pass gas or stool, or inability to urinate.     -You must understand that you've received an Urgent Care treatment only and that you may be released before all your medical problems are known or treated. You, the patient, will arrange for follow up care as instructed. Please arrange follow up with your primary medical clinic within 2-5 days if your signs and symptoms have not resolved or worsen.     - Follow up with your PCP or specialty clinic as directed.  You can call [phone] or [phone] to schedule an appointment with the appropriate provider.  Schedule [facility] is open Mon-Friday 8-5pm (excluded holidays).    - If your condition worsens or fails to improve we recommend that you receive another evaluation at the emergency room immediately or contact your primary medical clinic to discuss your concerns.            Prevention steps for patients with confirmed or suspected COVID-19  Stay home and stay away from family members and friends. The CDC says, you can leave home after these three things have happened: 1) You have had no fever for at least 24 hours (that is one full day of no fever without the use of medicine that reduces fevers) 2) AND other symptoms have improved (for example, when your cough or shortness of breath have improved) 3) AND at least 10 days have passed since your symptoms first appeared OR after 6-10 days passed  from first positive test (with mask on till day #11).  Separate yourself from other people and animals in your home.  Call ahead before visiting your doctor.  Wear a facemask.  Cover your coughs and sneezes.  Wash your hands often with soap and water; hand  can be used, too.  Avoid sharing personal household items.  Wipe down surfaces used daily.  Monitor your symptoms. Seek prompt medical attention if your illness is worsening (e.g., difficulty breathing).   Before seeking care, call your healthcare provider.  If you have a medical emergency and need to call 911, notify the dispatch personnel that you have, or are being evaluated for COVID-19. If possible, put on a facemask before emergency medical services arrive.      Recommended precautions for household members, intimate partners, and caregivers in a home setting of a patient with symptomatic laboratory-confirmed COVID-19 or a patient under investigation.  Household members, intimate partners, and caregivers in the home setting awaiting tests results have close contact with a person with symptomatic, laboratory-confirmed COVID-19 or a person under investigation. Close contacts should monitor their health; they should call their provider right away if they develop symptoms suggestive of COVID-19 (e.g., fever, cough, shortness of breath).    Close contacts should also follow these recommendations:  Make sure that you understand and can help the patient follow their provider's instructions for medication(s) and care. You should help the patient with basic needs in the home and provide support for getting groceries, prescriptions, and other personal needs.  Monitor the patient's symptoms. If the patient is getting sicker, call his or her healthcare provider and tell them that the patient has laboratory-confirmed COVID-19. If the patient has a medical emergency and you need to call 911, notify the dispatch personnel that the patient has, or is being  evaluated for COVID-19.  Household members should stay in another room or be  from the patient. Household members should use a separate bedroom and bathroom, if available.  Prohibit visitors.  Household members should care for any pets in the home.  Make sure that shared spaces in the home have good air flow, such as by an air conditioner or an opened window, weather permitting.  Perform hand hygiene frequently. Wash your hands often with soap and water for at least 20 seconds or use an alcohol-based hand  (that contains > 60% alcohol) covering all surfaces of your hands and rubbing them together until they feel dry. Soap and water should be used preferentially.  Avoid touching your eyes, nose, and mouth.  The patient should wear a facemask. If the patient is not able to wear a facemask (for example, because it causes trouble breathing), caregivers should wear a mask when they are in the same room as the patient.  Wear a disposable facemask and gloves when you touch or have contact with the patient's blood, stool, or body fluids, such as saliva, sputum, nasal mucus, vomit, urine.  Throw out disposable facemasks and gloves after using them. Do not reuse.  When removing personal protective equipment, first remove and dispose of gloves. Then, immediately clean your hands with soap and water or alcohol-based hand . Next, remove and dispose of facemask, and immediately clean your hands again with soap and water or alcohol-based hand .  You should not share dishes, drinking glasses, cups, eating utensils, towels, bedding, or other items with the patient. After the patient uses these items, you should wash them thoroughly (see below Wash laundry thoroughly).  Clean all high-touch surfaces, such as counters, tabletops, doorknobs, bathroom fixtures, toilets, phones, keyboards, tablets, and bedside tables, every day. Also, clean any surfaces that may have blood, stool, or body fluids on  them.  Use a household cleaning spray or wipe, according to the label instructions. Labels contain instructions for safe and effective use of the cleaning product including precautions you should take when applying the product, such as wearing gloves and making sure you have good ventilation during use of the product.  Wash laundry thoroughly.  Immediately remove and wash clothes or bedding that have blood, stool, or body fluids on them.  Wear disposable gloves while handling soiled items and keep soiled items away from your body. Clean your hands (with soap and water or an alcohol-based hand ) immediately after removing your gloves.  Read and follow directions on labels of laundry or clothing items and detergent. In general, using a normal laundry detergent according to washing machine instructions and dry thoroughly using the warmest temperatures recommended on the clothing label.  Place all used disposable gloves, facemasks, and other contaminated items in a lined container before disposing of them with other household waste. Clean your hands (with soap and water or an alcohol-based hand ) immediately after handling these items. Soap and water should be used preferentially if hands are visibly dirty.  Discuss any additional questions with your state or local health department or healthcare provider. Check available hours when contacting your local health department.    For more information see CDC link below.      https://www.cdc.gov/coronavirus/2019-ncov/hcp/guidance-prevent-spread.html#precautions        Sources:  [facility], [place] [facility]          Instructions for Home Care of Patients and Caretakers with Coronavirus Disease 2019  Limit visitors to the home.  Older persons and those that have chronic medical conditions such as diabetes, lung and heart disease are at increased risk for illness.   If possible, patients should use a separate bedroom while recovering.  Caregivers and household members should avoid prolonged contact with the patient which means to stay 6 feet away and avoid contact with cough droplets.  When close contact is necessary, wash your hands before and immediately after contact.   Perform hand hygiene frequently. Wash your hands often with soap and water for at least 20 seconds or use an alcohol-based hand , covering all surfaces of your hands and rubbing them together until they feel dry.   Avoid touching your eyes, nose, and mouth with unwashed hands.  Avoid sharing household items with the patient. You should not share dishes, drinking glasses, cups, eating utensils, towels, bedding, or other items. After the patient uses these items, you should wash them thoroughly.  Wash laundry thoroughly.   Immediately remove and wash clothes or bedding that have blood, stool, or body fluids on them.  Clean all high-touch surfaces, such as counters, tabletops, doorknobs, bathroom fixtures, toilets, phones, keyboards, tablets, and bedside tables, every day.   Use a household cleaning spray or wipe, according to the label instructions. Labels contain instructions for safe and effective use of the cleaning product including precautions you should take when applying the product, such as wearing gloves and making sure you have good ventilation during use of the product.    For more information see CDC link below.      https://www.cdc.gov/coronavirus/2019-ncov/hcp/guidance-prevent-spread.html#precautions               If your symptoms worsen or if you have any other concerns, please contact [facility] at [phone].

## 2023-09-06 ENCOUNTER — HOSPITAL ENCOUNTER (EMERGENCY)
Facility: HOSPITAL | Age: 28
Discharge: HOME | End: 2023-09-06
Attending: EMERGENCY MEDICINE
Payer: COMMERCIAL

## 2023-09-06 VITALS
TEMPERATURE: 98 F | OXYGEN SATURATION: 97 % | BODY MASS INDEX: 25.75 KG/M2 | RESPIRATION RATE: 18 BRPM | HEART RATE: 71 BPM | SYSTOLIC BLOOD PRESSURE: 128 MMHG | DIASTOLIC BLOOD PRESSURE: 66 MMHG | WEIGHT: 150 LBS

## 2023-09-06 DIAGNOSIS — R11.2 NAUSEA AND VOMITING, UNSPECIFIED VOMITING TYPE: ICD-10-CM

## 2023-09-06 DIAGNOSIS — R10.9 ABDOMINAL PAIN: ICD-10-CM

## 2023-09-06 DIAGNOSIS — R10.13 EPIGASTRIC ABDOMINAL PAIN: Primary | ICD-10-CM

## 2023-09-06 LAB
ALBUMIN SERPL BCP-MCNC: 4.4 G/DL (ref 3.5–5.2)
ALP SERPL-CCNC: 118 U/L (ref 55–135)
ALT SERPL W/O P-5'-P-CCNC: 15 U/L (ref 10–44)
ANION GAP SERPL CALC-SCNC: 11 MMOL/L (ref 8–16)
AST SERPL-CCNC: 24 U/L (ref 10–40)
BASOPHILS # BLD AUTO: 0.02 K/UL (ref 0–0.2)
BASOPHILS NFR BLD: 0.5 % (ref 0–1.9)
BILIRUB SERPL-MCNC: 0.7 MG/DL (ref 0.1–1)
BUN SERPL-MCNC: 11 MG/DL (ref 6–20)
CALCIUM SERPL-MCNC: 9.7 MG/DL (ref 8.7–10.5)
CHLORIDE SERPL-SCNC: 104 MMOL/L (ref 95–110)
CO2 SERPL-SCNC: 22 MMOL/L (ref 23–29)
CREAT SERPL-MCNC: 0.9 MG/DL (ref 0.5–1.4)
DIFFERENTIAL METHOD: ABNORMAL
EOSINOPHIL # BLD AUTO: 0.1 K/UL (ref 0–0.5)
EOSINOPHIL NFR BLD: 2.3 % (ref 0–8)
ERYTHROCYTE [DISTWIDTH] IN BLOOD BY AUTOMATED COUNT: 11.9 % (ref 11.5–14.5)
EST. GFR  (NO RACE VARIABLE): >60 ML/MIN/1.73 M^2
GLUCOSE SERPL-MCNC: 83 MG/DL (ref 70–110)
HCT VFR BLD AUTO: 33 % (ref 40–54)
HCV AB SERPL QL IA: NORMAL
HGB BLD-MCNC: 11 G/DL (ref 14–18)
HIV 1+2 AB+HIV1 P24 AG SERPL QL IA: NORMAL
IMM GRANULOCYTES # BLD AUTO: 0.01 K/UL (ref 0–0.04)
IMM GRANULOCYTES NFR BLD AUTO: 0.2 % (ref 0–0.5)
LIPASE SERPL-CCNC: 8 U/L (ref 4–60)
LYMPHOCYTES # BLD AUTO: 1.1 K/UL (ref 1–4.8)
LYMPHOCYTES NFR BLD: 24.1 % (ref 18–48)
MCH RBC QN AUTO: 29.1 PG (ref 27–31)
MCHC RBC AUTO-ENTMCNC: 33.3 G/DL (ref 32–36)
MCV RBC AUTO: 87 FL (ref 82–98)
MONOCYTES # BLD AUTO: 0.6 K/UL (ref 0.3–1)
MONOCYTES NFR BLD: 13.2 % (ref 4–15)
NEUTROPHILS # BLD AUTO: 2.6 K/UL (ref 1.8–7.7)
NEUTROPHILS NFR BLD: 59.7 % (ref 38–73)
NRBC BLD-RTO: 0 /100 WBC
PLATELET # BLD AUTO: 220 K/UL (ref 150–450)
PMV BLD AUTO: 10.4 FL (ref 9.2–12.9)
POTASSIUM SERPL-SCNC: 3.9 MMOL/L (ref 3.5–5.1)
PROT SERPL-MCNC: 8.4 G/DL (ref 6–8.4)
RBC # BLD AUTO: 3.78 M/UL (ref 4.6–6.2)
SODIUM SERPL-SCNC: 137 MMOL/L (ref 136–145)
WBC # BLD AUTO: 4.39 K/UL (ref 3.9–12.7)

## 2023-09-06 PROCEDURE — 86803 HEPATITIS C AB TEST: CPT | Performed by: PHYSICIAN ASSISTANT

## 2023-09-06 PROCEDURE — 93010 ELECTROCARDIOGRAM REPORT: CPT | Mod: ,,, | Performed by: INTERNAL MEDICINE

## 2023-09-06 PROCEDURE — 96374 THER/PROPH/DIAG INJ IV PUSH: CPT

## 2023-09-06 PROCEDURE — 99284 EMERGENCY DEPT VISIT MOD MDM: CPT | Mod: 25

## 2023-09-06 PROCEDURE — 96372 THER/PROPH/DIAG INJ SC/IM: CPT | Mod: 59 | Performed by: PHYSICIAN ASSISTANT

## 2023-09-06 PROCEDURE — 25000003 PHARM REV CODE 250: Performed by: PHYSICIAN ASSISTANT

## 2023-09-06 PROCEDURE — 96361 HYDRATE IV INFUSION ADD-ON: CPT

## 2023-09-06 PROCEDURE — 63600175 PHARM REV CODE 636 W HCPCS: Performed by: PHYSICIAN ASSISTANT

## 2023-09-06 PROCEDURE — 87389 HIV-1 AG W/HIV-1&-2 AB AG IA: CPT | Performed by: PHYSICIAN ASSISTANT

## 2023-09-06 PROCEDURE — 93005 ELECTROCARDIOGRAM TRACING: CPT

## 2023-09-06 PROCEDURE — 83690 ASSAY OF LIPASE: CPT | Performed by: PHYSICIAN ASSISTANT

## 2023-09-06 PROCEDURE — 93010 EKG 12-LEAD: ICD-10-PCS | Mod: ,,, | Performed by: INTERNAL MEDICINE

## 2023-09-06 PROCEDURE — 80053 COMPREHEN METABOLIC PANEL: CPT | Performed by: PHYSICIAN ASSISTANT

## 2023-09-06 PROCEDURE — 85025 COMPLETE CBC W/AUTO DIFF WBC: CPT | Performed by: PHYSICIAN ASSISTANT

## 2023-09-06 RX ORDER — FAMOTIDINE 10 MG/ML
20 INJECTION INTRAVENOUS
Status: COMPLETED | OUTPATIENT
Start: 2023-09-06 | End: 2023-09-06

## 2023-09-06 RX ORDER — DICYCLOMINE HYDROCHLORIDE 10 MG/ML
20 INJECTION INTRAMUSCULAR
Status: COMPLETED | OUTPATIENT
Start: 2023-09-06 | End: 2023-09-06

## 2023-09-06 RX ORDER — ONDANSETRON 4 MG/1
4 TABLET, ORALLY DISINTEGRATING ORAL EVERY 8 HOURS PRN
Qty: 12 TABLET | Refills: 0 | Status: SHIPPED | OUTPATIENT
Start: 2023-09-06

## 2023-09-06 RX ORDER — PANTOPRAZOLE SODIUM 20 MG/1
20 TABLET, DELAYED RELEASE ORAL DAILY
Qty: 14 TABLET | Refills: 0 | Status: SHIPPED | OUTPATIENT
Start: 2023-09-06 | End: 2023-09-20

## 2023-09-06 RX ORDER — SUCRALFATE 1 G/10ML
1 SUSPENSION ORAL
Qty: 400 ML | Refills: 0 | Status: SHIPPED | OUTPATIENT
Start: 2023-09-06 | End: 2023-09-16

## 2023-09-06 RX ADMIN — SODIUM CHLORIDE, POTASSIUM CHLORIDE, SODIUM LACTATE AND CALCIUM CHLORIDE 1000 ML: 600; 310; 30; 20 INJECTION, SOLUTION INTRAVENOUS at 08:09

## 2023-09-06 RX ADMIN — DICYCLOMINE HYDROCHLORIDE 20 MG: 20 INJECTION, SOLUTION INTRAMUSCULAR at 08:09

## 2023-09-06 RX ADMIN — FAMOTIDINE 20 MG: 10 INJECTION, SOLUTION INTRAVENOUS at 08:09

## 2023-09-07 NOTE — ED PROVIDER NOTES
Encounter Date: 9/6/2023       History     Chief Complaint   Patient presents with    Abdominal Pain     C/o abdominal pain, nausea and diarrhea x 1 day. Sent from  for possible hernia.     28-year-old male with past medical history of hypothyroidism and asthma who presents to the emergency department with chief complaint of epigastric abdominal pain, nausea, vomiting, diarrhea that began early this morning.  He endorses 2 episodes of nonbloody emesis and 1 episode of diarrhea earlier today.  His epigastric pain has been intermittent.  He describes pain as dull.  It does not radiate.  Denies symptoms of this nature in the past.  Denies any abnormal food ingestion.  No medication prior to arrival.  He was seen at an outside urgent care earlier today.  He was instructed to come to the emergency department with concern for a hernia.  He denies other worsening or alleviating factors.      Review of patient's allergies indicates:  No Known Allergies  Past Medical History:   Diagnosis Date    Allergy     seasonal    Asthma     GHD (growth hormone deficiency) 7/14/2017    Growth hormone deficiency     HGHG (hypogonadotropic hypogonadism) 11/22/2017    Hypothyroidism     Hypothyroidism 11/22/2017    Short stature due to endocrine disorder 7/16/2017     History reviewed. No pertinent surgical history.  Family History   Problem Relation Age of Onset    No Known Problems Mother     No Known Problems Father     Cancer Paternal Grandfather         prostate and ENT ca    Melanoma Neg Hx     Psoriasis Neg Hx     Lupus Neg Hx     Acne Neg Hx     Eczema Neg Hx      Social History     Tobacco Use    Smoking status: Never    Smokeless tobacco: Never   Substance Use Topics    Alcohol use: No    Drug use: No     Review of Systems   Gastrointestinal:  Positive for abdominal pain, diarrhea, nausea and vomiting.       Physical Exam     Initial Vitals [09/06/23 1918]   BP Pulse Resp Temp SpO2   124/88 75 16 98.8 °F (37.1 °C) (!) 94 %       MAP       --         Physical Exam    Vitals reviewed.  Constitutional: He appears well-developed and well-nourished. He is not diaphoretic. No distress.   HENT:   Head: Normocephalic and atraumatic.   Mouth/Throat: Oropharynx is clear and moist.   Eyes: EOM are normal. Pupils are equal, round, and reactive to light.   Neck: Neck supple.   Normal range of motion.  Cardiovascular:  Normal rate, regular rhythm, normal heart sounds and intact distal pulses.     Exam reveals no gallop and no friction rub.       No murmur heard.  Pulmonary/Chest: Breath sounds normal. He has no wheezes. He has no rhonchi. He has no rales.   Abdominal: Abdomen is soft. Bowel sounds are normal. There is abdominal tenderness (mild) in the epigastric area.   Soft, non tender mass noted near LLQ. There is no tenderness to palpation. No overlying skin changes    Musculoskeletal:         General: Normal range of motion.      Cervical back: Normal range of motion and neck supple.     Neurological: He is alert and oriented to person, place, and time. He has normal strength. GCS score is 15. GCS eye subscore is 4. GCS verbal subscore is 5. GCS motor subscore is 6.   Skin: Skin is warm and dry. Capillary refill takes less than 2 seconds.   Psychiatric: He has a normal mood and affect. His behavior is normal. Judgment and thought content normal.         ED Course   Procedures  Labs Reviewed   CBC W/ AUTO DIFFERENTIAL - Abnormal; Notable for the following components:       Result Value    RBC 3.78 (*)     Hemoglobin 11.0 (*)     Hematocrit 33.0 (*)     All other components within normal limits   COMPREHENSIVE METABOLIC PANEL - Abnormal; Notable for the following components:    CO2 22 (*)     All other components within normal limits   HIV 1 / 2 ANTIBODY    Narrative:     Release to patient->Immediate   HEPATITIS C ANTIBODY    Narrative:     Release to patient->Immediate   LIPASE          Imaging Results    None          Medications   lactated  ringers bolus 1,000 mL (0 mLs Intravenous Stopped 9/6/23 2204)   famotidine (PF) injection 20 mg (20 mg Intravenous Given 9/6/23 2025)   dicyclomine injection 20 mg (20 mg Intramuscular Given 9/6/23 2015)     Medical Decision Making  Emergent evaluation of a 28 y.o. male presenting to the emergency department complaining of upper abdominal pain, nausea, vomiting, diarrhea. Patient is afebrile, hemodynamically stable, and non toxic appearing.     Differential diagnosis includes but isn't limited to gastritis pancreatitis, gallbladder disease.     Patient presenting with upper abdominal pain, nausea, vomiting, diarrhea that began earlier today.  He endorses nonbloody emesis and nonbloody diarrhea.  He also reports a mass to the left lower quadrant of his abdomen that has been present for the last couple of years.  He was evaluated at urgent care earlier today.  The nurse practitioner that saw him at urgent care was concerned for a hernia and sent him to the emergency department for a CT scan.  The patient has very mild tenderness palpation in his epigastrium.  He has absolutely no tenderness palpation in the left lower quadrant.  Mass that is present is likely a cyst versus hernia.  He has large of adipose tissue in his area therefore it is difficult to determine.  Regardless, the patient has no pain in the area.  It is very soft and nontender.  He needed a CT scan to further assess this.  After a long discussion with the patient and patient's mother, they are agreeable to forego CT scan tonight.  I do not feel that mass in the left lower quadrant is causing his upper abdominal pain.  I also do not think CT imaging for his mild epigastric tenderness to palpation is warranted.    No severe metabolic or hematologic derangements.  Lipase of 8.  On reassessment, patient reports improvement symptoms with ED management.  He is now able to tolerate p.o..  He has no pain.  Repeat abdominal exam is benign.  He has no  tenderness palpation.  The mass in the left lower quadrant remains soft and nontender.    Will give Protonix, Carafate, and Zofran for home use.  Recommend outpatient follow-up with PCP, GI, and General surgery.  Return precautions.  All questions answered.  The patient was instructed to follow up with a primary care provider, GI, gen surg or to return to the emergency department for worsening symptoms. The treatment plan was discussed with the patient who demonstrated understanding and comfort with plan.     Amount and/or Complexity of Data Reviewed  Labs:  Decision-making details documented in ED Course.    Risk  Prescription drug management.               ED Course as of 09/07/23 0036   Wed Sep 06, 2023   2039 WBC: 4.39 [JM]   2039 Hemoglobin(!): 11.0 [JM]   2039 Hematocrit(!): 33.0 [JM]   2039 Platelets: 220 [JM]      ED Course User Index  [JM] Clementine Live PA-C                    Clinical Impression:   Final diagnoses:  [R10.9] Abdominal pain  [R10.13] Epigastric abdominal pain (Primary)  [R11.2] Nausea and vomiting, unspecified vomiting type        ED Disposition Condition    Discharge Stable          ED Prescriptions       Medication Sig Dispense Start Date End Date Auth. Provider    pantoprazole (PROTONIX) 20 MG tablet Take 1 tablet (20 mg total) by mouth once daily. for 14 days 14 tablet 9/6/2023 9/20/2023 Clementine Live PA-C    ondansetron (ZOFRAN-ODT) 4 MG TbDL Take 1 tablet (4 mg total) by mouth every 8 (eight) hours as needed (nausea). 12 tablet 9/6/2023 -- Clementine Live PA-C    sucralfate (CARAFATE) 100 mg/mL suspension Take 10 mLs (1 g total) by mouth 4 (four) times daily before meals and nightly. for 10 days 400 mL 9/6/2023 9/16/2023 Clementine Live PA-C          Follow-up Information       Follow up With Specialties Details Why Contact Info Additional Information    [facility] Emergency Medicine Go to  If symptoms worsen [address]  [place]  [place] [id number] [phone]     Zenaida Duffy MD Pediatrics Schedule an appointment as soon as possible for a visit in 1 week  [address]  [facility] [address]  [phone]       [facility] 4th Fl Gastroenterology Schedule an appointment as soon as possible for a visit in 1 week  [address] [id number] [phone] [facility] Urology - Main [facility], 4th Floor Please park in [facility] and take Atrium elevator    [facility] Surgery Schedule an appointment as soon as possible for a visit in 1 week  [address] [id number] [phone] Multispecialty Surgery Clinic - Main Building, 2nd Floor Please park in [facility] and use escalator or Clinic elevator             Clementine Live PA-C  09/07/23 0036

## 2023-09-07 NOTE — DISCHARGE INSTRUCTIONS
Your workup today does not show any significant abnormalities.  Take Zofran as needed for nausea.  Take Protonix daily.  Take Carafate as prescribed.  Follow up with your primary doctor, GI, general surgery or return to the emergency department sooner for any new or worsening symptoms.

## 2023-09-07 NOTE — ED TRIAGE NOTES
Chief Complaint   Patient presents with    Abdominal Pain     C/o abdominal pain, nausea and diarrhea x 1 day. Sent from  for possible hernia.   Soft nontender swelling to the LLQ that pt. States he has had for years.      APPEARANCE: No acute distress.    NEURO: Awake, alert, appropriate for age  HEENT: Head symmetrical. No obvious deformity  RESPIRATORY: Airway is open and patent. Respirations are spontaneous on room air.   NEUROVASCULAR: All extremities are warm and pink with capillary refill less than 3 seconds.   MUSCULOSKELETAL: Moves all extremities, wiggling toes and moving hands.   SKIN: Warm and dry, adequate turgor, mucus membranes moist and pink  SOCIAL: Patient is accompanied by family.   Will continue to monitor.

## 2023-09-07 NOTE — FIRST PROVIDER EVALUATION
Emergency Department TeleTriage Encounter Note      CHIEF COMPLAINT    Chief Complaint   Patient presents with    Abdominal Pain     C/o abdominal pain, nausea and diarrhea x 1 day. Sent from  for possible hernia.       VITAL SIGNS   Initial Vitals [09/06/23 1918]   BP Pulse Resp Temp SpO2   124/88 75 16 98.8 °F (37.1 °C) (!) 94 %      MAP       --            ALLERGIES    Review of patient's allergies indicates:  No Known Allergies    PROVIDER TRIAGE NOTE  28-year-old male presenting with epigastric abdominal pain that began last night.  Decreased p.o. intake today to secondary to pain.  No medication taken prior to arrival.  Vital signs normal.      ORDERS  Labs Reviewed   HIV 1 / 2 ANTIBODY   HEPATITIS C ANTIBODY       ED Orders (720h ago, onward)      Start Ordered     Status Ordering Provider    09/06/23 1920 09/06/23 1919  HIV 1/2 Ag/Ab (4th Gen)  STAT         Acknowledged AMPARO VICTORIA    09/06/23 1920 09/06/23 1919  Hepatitis C Antibody  STAT         Acknowledged AMPARO VICTORIA    09/06/23 1920 09/06/23 1919  EKG 12-lead  Once         Completed by ALEX CHAMBERS on 9/6/2023 at  7:25 PM NORMA TORRES              Virtual Visit Note: The provider triage portion of this emergency department evaluation and documentation was performed via [employer], a HIPAA-compliant telemedicine application, in concert with a tele-presenter in the room. A face to face patient evaluation with one of my colleagues will occur once the patient is placed in an emergency department room.      DISCLAIMER: This note was prepared with [employer] voice recognition transcription software. Garbled syntax, mangled pronouns, and other bizarre constructions may be attributed to that software system.

## 2023-10-29 ENCOUNTER — HOSPITAL ENCOUNTER (EMERGENCY)
Facility: HOSPITAL | Age: 28
Discharge: HOME | End: 2023-10-30
Attending: EMERGENCY MEDICINE
Payer: COMMERCIAL

## 2023-10-29 DIAGNOSIS — J45.901 EXACERBATION OF ASTHMA, UNSPECIFIED ASTHMA SEVERITY, UNSPECIFIED WHETHER PERSISTENT: Primary | ICD-10-CM

## 2023-10-29 DIAGNOSIS — J45.21 MILD INTERMITTENT ASTHMA WITH EXACERBATION: ICD-10-CM

## 2023-10-29 DIAGNOSIS — J06.9 VIRAL URI WITH COUGH: ICD-10-CM

## 2023-10-29 PROCEDURE — 99284 EMERGENCY DEPT VISIT MOD MDM: CPT

## 2023-10-30 VITALS
DIASTOLIC BLOOD PRESSURE: 77 MMHG | TEMPERATURE: 99 F | HEART RATE: 82 BPM | RESPIRATION RATE: 18 BRPM | OXYGEN SATURATION: 97 % | SYSTOLIC BLOOD PRESSURE: 121 MMHG | WEIGHT: 150 LBS | BODY MASS INDEX: 25.75 KG/M2

## 2023-10-30 PROCEDURE — 94640 AIRWAY INHALATION TREATMENT: CPT | Mod: XB

## 2023-10-30 PROCEDURE — 27100098 HC SPACER

## 2023-10-30 PROCEDURE — 25000242 PHARM REV CODE 250 ALT 637 W/ HCPCS: Performed by: EMERGENCY MEDICINE

## 2023-10-30 PROCEDURE — 94640 AIRWAY INHALATION TREATMENT: CPT

## 2023-10-30 PROCEDURE — 99900035 HC TECH TIME PER 15 MIN (STAT)

## 2023-10-30 PROCEDURE — 63600175 PHARM REV CODE 636 W HCPCS: Performed by: EMERGENCY MEDICINE

## 2023-10-30 PROCEDURE — 94761 N-INVAS EAR/PLS OXIMETRY MLT: CPT

## 2023-10-30 RX ORDER — ALBUTEROL SULFATE 90 UG/1
2 AEROSOL, METERED RESPIRATORY (INHALATION) ONCE
Status: COMPLETED | OUTPATIENT
Start: 2023-10-30 | End: 2023-10-30

## 2023-10-30 RX ORDER — ALBUTEROL SULFATE 90 UG/1
2 AEROSOL, METERED RESPIRATORY (INHALATION) EVERY 6 HOURS PRN
Qty: 18 G | Refills: 0 | Status: SHIPPED | OUTPATIENT
Start: 2023-10-30 | End: 2024-10-29

## 2023-10-30 RX ORDER — IPRATROPIUM BROMIDE AND ALBUTEROL SULFATE 2.5; .5 MG/3ML; MG/3ML
3 SOLUTION RESPIRATORY (INHALATION) ONCE
Status: COMPLETED | OUTPATIENT
Start: 2023-10-30 | End: 2023-10-30

## 2023-10-30 RX ADMIN — DEXAMETHASONE 10 MG: 4 TABLET ORAL at 01:10

## 2023-10-30 RX ADMIN — IPRATROPIUM BROMIDE AND ALBUTEROL SULFATE 3 ML: .5; 3 SOLUTION RESPIRATORY (INHALATION) at 12:10

## 2023-10-30 RX ADMIN — ALBUTEROL SULFATE 2 PUFF: 108 INHALANT RESPIRATORY (INHALATION) at 01:10

## 2023-10-30 NOTE — ED PROVIDER NOTES
History:  Milton Berkowitz is a 28 y.o. male who presents to the ED with Shortness of Breath (Hx asthma. C/o SOB. Reports out of inhaler, requesting breathing tx)    Described as 29yo M with PMH asthma presenting to the ER with SOB. He reports he typically uses his inhaler 3 times per week but his SOB gets worse when the weather changes. He ran out of his inhaler 1-2 weeks ago and has had progressively worsening SOB. Denies cough, fevers, chills, chest pain.     Review of Systems: All systems reviewed and are negative except as per history of present illness.    Medications:   Current Discharge Medication List        CONTINUE these medications which have NOT CHANGED    Details   albuterol (PROVENTIL) 2.5 mg /3 mL (0.083 %) nebulizer solution Take 3 mLs (2.5 mg total) by nebulization every 6 (six) hours as needed for Wheezing or Shortness of Breath. Rescue  Qty: 83 mL, Refills: 0    Associated Diagnoses: Viral URI with cough; Mild intermittent asthma with exacerbation      cholecalciferol, vitamin D3, (VITAMIN D3) 50 mcg (2,000 unit) Cap Take 1 capsule (2,000 Units total) by mouth once daily.    Associated Diagnoses: Vitamin D deficiency disease      hydrocortisone (CORTEF) 5 MG Tab Take 10 mg in the morning and 5 mg in the evening  Qty: 100 tablet, Refills: 11    Associated Diagnoses: Panhypopituitarism; Secondary adrenal insufficiency      inhalation spacing device Use as directed for inhalation.  Qty: 1 Device, Refills: 0    Comments: Please show patient how to use this.  Associated Diagnoses: Asthma, unspecified asthma severity, unspecified whether complicated, unspecified whether persistent      levothyroxine (SYNTHROID) 112 MCG tablet Take 1 tablet (112 mcg total) by mouth before breakfast.  Qty: 30 tablet, Refills: 11      ondansetron (ZOFRAN) 4 MG tablet Take 1 tablet (4 mg total) by mouth every 6 (six) hours as needed for Nausea.  Qty: 30 tablet, Refills: 0    Associated Diagnoses: COVID-19 virus infection;  Non-intractable vomiting with nausea, unspecified vomiting type      ondansetron (ZOFRAN-ODT) 4 MG TbDL Take 1 tablet (4 mg total) by mouth every 8 (eight) hours as needed (nausea).  Qty: 12 tablet, Refills: 0      pantoprazole (PROTONIX) 20 MG tablet Take 1 tablet (20 mg total) by mouth once daily. 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 Apply topically once daily in the morning to clean, dry, intact skin of the shoulders and upper arms  Qty: 30 packet, Refills: 5    Associated Diagnoses: Panhypopituitarism             PMH:   Past Medical History:   Diagnosis Date    Allergy     seasonal    Asthma     GHD (growth hormone deficiency) 7/14/2017    Growth hormone deficiency     HGHG (hypogonadotropic hypogonadism) 11/22/2017    Hypothyroidism     Hypothyroidism 11/22/2017    Short stature due to endocrine disorder 7/16/2017     PSH: No past surgical history on file.  Allergies: He has No Known Allergies.  Social History: Marital Status: single. He  reports that he has never smoked. He has never used smokeless tobacco.. He  reports no history of alcohol use..       Exam:  VITAL SIGNS:   Vitals:    10/29/23 2348 10/30/23 0021   BP: (!) 142/83    BP Location: Right arm    Patient Position: Sitting    Pulse: 92 86   Resp: (!) 22 18   Temp: 98.5 °F (36.9 °C)    TempSrc: Oral    SpO2: 95% 96%   Weight: 68 kg (150 lb)      Const: Awake and alert, NAD   Head: Atraumatic  Eyes: Normal Conjunctiva  ENT: Normal External Ears, Nose and Mouth.  Neck: Full range of motion. No meningismus.  Resp: Normal respiratory effort, No distress, no increased WOB. Bilateral inspiratory and expiratory wheezes bilaterally.    Cardio: Equal and intact distal pulses  Abd: Soft, non tender, non distended.   Skin: No petechiae or rashes  Ext: No cyanosis, or edema  Neur: Awake and alert  Psych: Normal Mood and Affect      Medical Decision Makin-year-old male with a history of asthma presenting to the emergency department with shortness of breath, feeling like his asthma after running out of his inhaler 2 weeks ago.  He was wheezy on examination though has no increased work of breathing.  He was given a nebulizer treatment and on reassessment, wheezing has significantly improved, though he still has mild wheezes scattered bilaterally.  He was given 2 puffs as well as 1 dose of Decadron for mild asthma exacerbation.  Albuterol inhaler will be refilled.  Doubt pneumonia, pneumothorax, PE, ACS, CHF, aortic dissection.  Strict return precautions discussed and Patient is stable for discharge home with outpatient follow up.     Clinical Impression:  1. Exacerbation of asthma, unspecified asthma severity, unspecified whether persistent    2. Viral URI with cough    3. Mild intermittent asthma with exacerbation             Medication List        CHANGE how you take these medications      * albuterol 2.5 mg /3 mL (0.083 %) nebulizer solution  Commonly known as: PROVENTIL  Take 3 mLs (2.5 mg total) by nebulization every 6 (six) hours as needed for Wheezing or Shortness of Breath. Rescue  What changed: Another medication with the same name was changed. Make sure you understand how and when to take each.     * albuterol 90 mcg/actuation inhaler  Commonly known as: VENTOLIN HFA  Inhale 2 puffs into the lungs every 6 (six) hours as needed for Wheezing or Shortness of Breath. Rescue  What changed: reasons to take this           * This list has 2 medication(s) that are the same as other medications prescribed for you. Read the directions carefully, and ask your doctor or other care provider to review them with you.                ASK your doctor  about these medications      cholecalciferol (vitamin D3) 50 mcg (2,000 unit) Cap capsule  Commonly known as: VITAMIN D3  Take 1 capsule (2,000 Units total) by mouth once daily.     hydrocortisone 5 MG Tab  Commonly known as: CORTEF  Take 10 mg in the morning and 5 mg in the evening     inhalation spacing device  Use as directed for inhalation.     levothyroxine 112 MCG tablet  Commonly known as: SYNTHROID  Take 1 tablet (112 mcg total) by mouth before breakfast.     NORDITROPIN FLEXPRO 10 mg/1.5 mL (6.7 mg/mL) Pnij  Generic drug: somatropin  Inject 3 mg into the skin daily in the evening     ondansetron 4 MG tablet  Commonly known as: ZOFRAN  Take 1 tablet (4 mg total) by mouth every 6 (six) hours as needed for Nausea.     ondansetron 4 MG Tbdl  Commonly known as: ZOFRAN-ODT  Take 1 tablet (4 mg total) by mouth every 8 (eight) hours as needed (nausea).     pantoprazole 20 MG tablet  Commonly known as: PROTONIX  Take 1 tablet (20 mg total) by mouth once daily. for 14 days     testosterone 1.62 % (20.25 mg/1.25 gram) Glpk  Commonly known as: ANDROGEL  Place 1.25 g onto the skin once daily. Apply topically once daily in the morning to clean, dry, intact skin of the shoulders and upper arms               Where to Get Your Medications        You can get these medications from any pharmacy    Bring a paper prescription for each of these medications  albuterol 90 mcg/actuation inhaler         Follow-up Information       Zenaida Duffy MD.    Specialty: Pediatrics  Contact information:  [address] [facility] [address]  [facility] [id number] [phone]                               Socorro Musa MD  10/30/23 3527

## 2023-10-30 NOTE — ED TRIAGE NOTES
Milton Berkowitz, a 28 y.o. male presents to the ED w/ complaint of shortness of breath. States is out of his rescue inhalor. Attempted a neb but it did not work    Triage note:  Chief Complaint   Patient presents with    Shortness of Breath     Hx asthma. C/o SOB. Reports out of inhaler, requesting breathing tx     Review of patient's allergies indicates:  No Known Allergies  Past Medical History:   Diagnosis Date    Allergy     seasonal    Asthma     GHD (growth hormone deficiency) 7/14/2017    Growth hormone deficiency     HGHG (hypogonadotropic hypogonadism) 11/22/2017    Hypothyroidism     Hypothyroidism 11/22/2017    Short stature due to endocrine disorder 7/16/2017      LOC: The patient is awake, alert, aware of environment with an appropriate affect. Oriented x4, speaking appropriately  APPEARANCE: Pt resting comfortably, in no acute distress, pt is clean and well groomed, clothing properly fastened  SKIN:The skin is warm and dry, color consistent with ethnicity, patient has normal skin turgor and moist mucus membranes, no bruising noted   RESPIRATORY:Airway is open and patent, respirations are spontaneous, patient has a normal effort and rate, no accessory muscle use noted. Lung sounds coarse in all fields on ascultation. Pt stating feels like he cant take a deep breath  CARDIAC: Normal rate and rhythm, no peripheral edema noted, capillary refill < 3 seconds, bilateral radial pulses 2+.  ABDOMEN: Soft, non tender, non distended.   NEUROLOGIC: PERRLA, facial expression is symmetrical, patient moving all extremities spontaneously, normal sensation in all extremities when touched with a finger.  Follows all commands appropriately  MUSCULOSKELETAL: Patient moving all extremities spontaneously, no obvious swelling or deformities noted.

## 2023-11-02 ENCOUNTER — PATIENT OUTREACH (OUTPATIENT)
Dept: EMERGENCY MEDICINE | Facility: HOSPITAL | Age: 28
End: 2023-11-02
Payer: COMMERCIAL

## 2023-11-02 NOTE — PROGRESS NOTES
Melvi Llamas LPN  ED Navigator  Emergency Department    Project: [facility] ED Navigator  Role: Community Health Worker    Date: 11/02/2023  Patient Name: Milton Berkowitz  MRN: [id number]  PCP: Zenaida Duffy MD    Assessment:     Milton Berkowitz is a 28 y.o. male who has presented to ED for SOB. Patient has visited the ED 2 times in the past 3 months. Patient did not contact PCP.     ED Navigator Initial Assessment    ED Navigator Enrollment Documentation  Consent to Services  Does patient consent to completing the assessment?: Yes  Contact  Method of Initial Contact: Phone  Transportation  Does the patient have issues with Transportation?: No  Does the patient have transportation to and from healthcare appointments?: Yes  Insurance Coverage  Do you have coverage/adequate coverage?: Yes  Type/kind of coverage: Blue Cross Blue Shield/Blue Connect [place] 1  Is patient able to afford co-pays/deductibles?: Yes  Is patient able to afford HME or supplies?: Yes  Does patient have an established [facility] PCP?: Yes  Able to access?: Yes  Does the patient have a lack of adequate coverage?: No  Specialist Appointment  Did the patient come to the ED to see a specialist?: No  Does the patient have a pending specialist referral?: No  Does the patient have a specialist appointment made?: No  PCP Follow Up Appointment  Has the patient had an appointment with a primary care provider in the past year?: No  Does the patient have a follow up appontment with a PCP?: No  When was the last time you saw your PCP?: 11/2/17  Why does the patient not have a follow up scheduled?: No established [facility]/outside PCP  Would you like an [facility] PCP?: Yes  Medications  Is patient able to afford medication?: Yes  Is patient unable to get medication due to lack of transportation?: No  Psychological  Does the patient have psycho-social concerns?: No  Food  Does the patient have concerns about food?: No  Communication/Education  Does the patient have limited  English proficiency/English not primary language?: No  Does patient have low literacy and/or low health literacy?: No  Does patient have concerns with care?: No  Does patient have dissatisfaction with care?: No  Other Financial Concerns  Does the patient have immediate financial distress?: No  Does the patient have general financial concerns?: Yes  Other Social Barriers/Concerns  Does the patient have any additional barriers or concerns?: None  Primary Barrier  Barriers identified: Structural barrier (service availability, waiting times, etc.)  Root Cause of ED Utilization: Lack of Access to Primary Care  Plan to address Lack of Access to Primary Care: Provided information for [facility] 24/7 Nurse Triage line [phone] or 1-866-[facility] ([phone]), Provided [facility] PCP assistance line [phone]  Next steps: Provided Education, Other Service  Was education/educational materials provided surrounding PCP services/creating a medical home?: Yes Was education verbal or written?: Written     Was education/educational materials provided surrounding low cost, healthy foods?: Yes Was education verbal or written?: Written     Was education/educational materials provided surrounding other items? If so, use comment to explain.: Yes Was education verbal or written?: Written   Plan: Provided information for [facility] 24/7 Nurse triage line, [phone] or 1-[facility] ([phone])  Expected Date of Follow Up 1: 11/30/23         Social History     Socioeconomic History    Marital status: Single   Occupational History    Occupation: student     Employer: [employer]   Tobacco Use    Smoking status: Never    Smokeless tobacco: Never   Substance and Sexual Activity    Alcohol use: No    Drug use: No     Social Determinants of Health     Financial Resource Strain: Low Risk  (11/2/2023)    Overall Financial Resource Strain (CARDIA)     Difficulty of Paying Living Expenses: Not very hard    Transportation Needs: Unknown (11/2/2023)    PRAPARE - Transportation     Lack of Transportation (Medical): No   Physical Activity: Inactive (11/2/2023)    Exercise Vital Sign     Days of Exercise per Week: 0 days     Minutes of Exercise per Session: 0 min   Stress: No Stress Concern Present (11/2/2023)    Citizen of [place] of Occupational Health - Occupational Stress Questionnaire     Feeling of Stress : Not at all   Social Connections: Unknown (11/2/2023)    Social Connection and Isolation Panel [NHANES]     Frequency of Communication with Friends and Family: More than three times a week     Frequency of Social Gatherings with Friends and Family: More than three times a week   Housing Stability: Unknown (11/2/2023)    Housing Stability Vital Sign     Unable to Pay for Housing in the Last Year: No     Unstable Housing in the Last Year: No       Plan:   ED Navigator called to follow up from last ER visit for SOB. Pt states he has been able to get his medications and feels that the treatments are working. Pt denies having a PCP. Pt advised on the PCP help line and will reach out to them to schedule according to his schedule. Patient was given Medicaid PCP Information Line ([phone]).  Pt denies having any other needs at this time. Resources forwarded to [email or web address] for future use. Pt advised that he may reach out as needed with any questions or concerns. Pt verbalized understanding.   Melvi Llamas      Appointment made with: Zenaida Duffy MD

## 2023-12-07 ENCOUNTER — PATIENT OUTREACH (OUTPATIENT)
Dept: EMERGENCY MEDICINE | Facility: HOSPITAL | Age: 28
End: 2023-12-07
Payer: COMMERCIAL

## 2024-01-22 ENCOUNTER — PATIENT OUTREACH (OUTPATIENT)
Dept: EMERGENCY MEDICINE | Facility: HOSPITAL | Age: 29
End: 2024-01-22
Payer: COMMERCIAL

## 2024-01-22 NOTE — PROGRESS NOTES
Call placed per ED Navigator to f/u from last encounter. No answer. V/m left. ED navigator will follow-up with patient on/around 3-6-24.

## 2024-07-23 ENCOUNTER — HOSPITAL ENCOUNTER (EMERGENCY)
Facility: HOSPITAL | Age: 29
Discharge: HOME | End: 2024-07-24
Attending: EMERGENCY MEDICINE
Payer: COMMERCIAL

## 2024-07-23 DIAGNOSIS — M25.579 ANKLE PAIN: ICD-10-CM

## 2024-07-23 DIAGNOSIS — M79.673 FOOT PAIN: ICD-10-CM

## 2024-07-23 DIAGNOSIS — M25.579 ANKLE PAIN, UNSPECIFIED CHRONICITY, UNSPECIFIED LATERALITY: Primary | ICD-10-CM

## 2024-07-23 PROCEDURE — 99283 EMERGENCY DEPT VISIT LOW MDM: CPT | Mod: 25

## 2024-07-23 NOTE — Clinical Note
"Milton Berkowitz (Tyler) was seen and treated in our emergency department on 7/23/2024.  He may return to work on 07/25/2024.       If you have any questions or concerns, please don't hesitate to call.      Bryn Jackson PA-C"

## 2024-07-24 VITALS
OXYGEN SATURATION: 99 % | DIASTOLIC BLOOD PRESSURE: 74 MMHG | HEART RATE: 68 BPM | HEIGHT: 66 IN | SYSTOLIC BLOOD PRESSURE: 124 MMHG | WEIGHT: 181 LBS | RESPIRATION RATE: 14 BRPM | BODY MASS INDEX: 29.09 KG/M2 | TEMPERATURE: 98 F

## 2024-07-24 NOTE — ED PROVIDER NOTES
Encounter Date: 7/23/2024       History     Chief Complaint   Patient presents with    Ankle Pain     Left ankle pain since yesterday. States hurts to bear weight but is not swollen     29-year-old male presents for evaluation of left ankle pain.  Denies trauma or injury.  States that he has been having pain for couple of days, pain is worse with ambulation.  Has not taken anything for the pain.  No obvious swelling or bruising noted.      Review of patient's allergies indicates:  No Known Allergies  Past Medical History:   Diagnosis Date    Allergy     seasonal    Asthma     GHD (growth hormone deficiency) 7/14/2017    Growth hormone deficiency     HGHG (hypogonadotropic hypogonadism) 11/22/2017    Hypothyroidism     Hypothyroidism 11/22/2017    Short stature due to endocrine disorder 7/16/2017     History reviewed. No pertinent surgical history.  Family History   Problem Relation Name Age of Onset    No Known Problems Mother      No Known Problems Father      Cancer Paternal Grandfather          prostate and ENT ca    Melanoma Neg Hx      Psoriasis Neg Hx      Lupus Neg Hx      Acne Neg Hx      Eczema Neg Hx       Social History     Tobacco Use    Smoking status: Never    Smokeless tobacco: Never   Substance Use Topics    Alcohol use: No    Drug use: No     Review of Systems   Constitutional:  Negative for fever.   HENT:  Negative for sore throat.    Respiratory:  Negative for shortness of breath.    Cardiovascular:  Negative for chest pain.   Gastrointestinal:  Negative for nausea.   Genitourinary:  Negative for dysuria.   Musculoskeletal:  Positive for arthralgias. Negative for back pain.   Skin:  Negative for rash.   Neurological:  Negative for weakness.   Hematological:  Does not bruise/bleed easily.       Physical Exam     Initial Vitals [07/23/24 2054]   BP Pulse Resp Temp SpO2   126/74 67 18 97.5 °F (36.4 °C) 98 %      MAP       --         Physical Exam    Constitutional: Vital signs are normal. He appears  well-developed and well-nourished.   HENT:   Head: Normocephalic and atraumatic.   Right Ear: Hearing normal.   Left Ear: Hearing normal.   Eyes: Conjunctivae are normal.   Cardiovascular:  Normal rate and regular rhythm.           Abdominal: Abdomen is soft. Bowel sounds are normal.   Musculoskeletal:         General: Normal range of motion.      Comments: Examination of the left ankle is normal.  No bruising, no swelling.  No deformity.  Normal pulses in the foot and the ankle.  Patient is ambulatory.  No pain to the year or fibula region.     Neurological: He is alert and oriented to person, place, and time.   Skin: Skin is warm and intact.   Psychiatric: He has a normal mood and affect. His speech is normal and behavior is normal. Cognition and memory are normal.         ED Course   Procedures  Labs Reviewed - No data to display       Imaging Results              X-Ray Foot Complete Left (Final result)  Result time 07/24/24 01:37:11      Final result by Jose G Waite MD (07/24/24 01:37:11)                   Impression:      Negative left ankle in foot in skeletally immature patient.      Electronically signed by: Jose G Waite  Date:    07/24/2024  Time:    01:37               Narrative:    EXAMINATION:  XR FOOT COMPLETE 3 VIEW LEFT; XR ANKLE COMPLETE 3 VIEW LEFT    CLINICAL HISTORY:  .  Pain in unspecified foot; Pain in unspecified ankle and joints of unspecified foot    TECHNIQUE:  AP, lateral and oblique views of the left foot were performed.  Three views of left ankle were also performed.    COMPARISON:  None    FINDINGS:  Bones, joint spaces and growth plates in the ankle appear intact without soft tissue swelling, fracture or dislocation.    The bones of the foot appear intact as do the growth plates.  No soft tissue swelling.  No radiopaque foreign body throughout the ankle or foot.                                       X-Ray Ankle Complete Left (Final result)  Result time 07/24/24 01:37:11       Final result by Jose G Waite MD (07/24/24 01:37:11)                   Impression:      Negative left ankle in foot in skeletally immature patient.      Electronically signed by: Jose G Waite  Date:    07/24/2024  Time:    01:37               Narrative:    EXAMINATION:  XR FOOT COMPLETE 3 VIEW LEFT; XR ANKLE COMPLETE 3 VIEW LEFT    CLINICAL HISTORY:  .  Pain in unspecified foot; Pain in unspecified ankle and joints of unspecified foot    TECHNIQUE:  AP, lateral and oblique views of the left foot were performed.  Three views of left ankle were also performed.    COMPARISON:  None    FINDINGS:  Bones, joint spaces and growth plates in the ankle appear intact without soft tissue swelling, fracture or dislocation.    The bones of the foot appear intact as do the growth plates.  No soft tissue swelling.  No radiopaque foreign body throughout the ankle or foot.                                       Medications - No data to display  Medical Decision Making  [age] with atraumatic left ankle and foot pain   Differential fracture, contusion, dislocation, sprain, cellulitis, DVT   Plan:  I reviewed the x-rays.  No obvious deformity noted.  I considered but doubt cellulitis.  I doubt DVT.  Patient advised to use Tylenol or Motrin over-the-counter.  No clear etiology given although no acute emergent process was identified.  Patient is stable for discharge.          Amount and/or Complexity of Data Reviewed  Radiology: ordered and independent interpretation performed.     Details: No fractures                                      Clinical Impression:  Final diagnoses:  [M25.579] Ankle pain  [M79.673] Foot pain  [M25.579] Ankle pain, unspecified chronicity, unspecified laterality (Primary)          ED Disposition Condition    Discharge Stable          ED Prescriptions    None       Follow-up Information    None          Bryn Jackson PA-C  07/24/24 0156

## 2024-07-24 NOTE — DISCHARGE INSTRUCTIONS

## 2024-07-24 NOTE — FIRST PROVIDER EVALUATION
Emergency Department TeleTriage Encounter Note      CHIEF COMPLAINT    Chief Complaint   Patient presents with    Ankle Pain     Left ankle pain since yesterday. States hurts to bear weight but is not swollen       VITAL SIGNS   Initial Vitals [07/23/24 2054]   BP Pulse Resp Temp SpO2   126/74 67 18 97.5 °F (36.4 °C) 98 %      MAP       --            ALLERGIES    Review of patient's allergies indicates:  No Known Allergies    PROVIDER TRIAGE NOTE  This is a teletriage evaluation of a 29 y.o. male presenting to the ED complaining of left ankle and foot pain after stepping wrong last night.     Initial orders will be placed and care will be transferred to an alternate provider when patient is roomed for a full evaluation. Any additional orders and the final disposition will be determined by that provider.     ORDERS  Labs Reviewed - No data to display    ED Orders (720h ago, onward)      Start Ordered     Status Ordering Provider    07/23/24 2154 07/23/24 2153  X-Ray Ankle Complete Left  1 time imaging         Ordered DELANEY ALLEN    07/23/24 2154 07/23/24 2153  X-Ray Foot Complete Left  1 time imaging         Ordered DELANEY ALLEN              Virtual Visit Note: The provider triage portion of this emergency department evaluation and documentation was performed via the grafternect, a HIPAA-compliant telemedicine application, in concert with a tele-presenter in the room. A face to face patient evaluation with one of my colleagues will occur once the patient is placed in an emergency department room.      DISCLAIMER: This note was prepared with [employer] voice recognition transcription software. Garbled syntax, mangled pronouns, and other bizarre constructions may be attributed to that software system.

## 2025-06-25 ENCOUNTER — HOSPITAL ENCOUNTER (OUTPATIENT)
Dept: RADIOLOGY | Facility: OTHER | Age: 30
Discharge: HOME | End: 2025-06-25
Attending: INTERNAL MEDICINE
Payer: COMMERCIAL

## 2025-06-25 DIAGNOSIS — Z13.79 ENCOUNTER FOR GENETIC SCREENING: ICD-10-CM

## 2025-06-25 PROCEDURE — 73100 X-RAY EXAM OF WRIST: CPT | Mod: TC,FY,LT

## 2025-06-25 PROCEDURE — 73100 X-RAY EXAM OF WRIST: CPT | Mod: 26,LT,, | Performed by: RADIOLOGY

## 2025-07-02 ENCOUNTER — HOSPITAL ENCOUNTER (OUTPATIENT)
Dept: RADIOLOGY | Facility: OTHER | Age: 30
Discharge: HOME | End: 2025-07-02
Attending: INTERNAL MEDICINE
Payer: COMMERCIAL

## 2025-07-02 DIAGNOSIS — E23.0 PRIMARY HYPOPITUITARISM: ICD-10-CM

## 2025-07-02 PROCEDURE — A9585 GADOBUTROL INJECTION: HCPCS | Performed by: INTERNAL MEDICINE

## 2025-07-02 PROCEDURE — 70553 MRI BRAIN STEM W/O & W/DYE: CPT | Mod: TC

## 2025-07-02 PROCEDURE — 70553 MRI BRAIN STEM W/O & W/DYE: CPT | Mod: 26,,, | Performed by: RADIOLOGY

## 2025-07-02 PROCEDURE — 25500020 PHARM REV CODE 255: Performed by: INTERNAL MEDICINE

## 2025-07-02 RX ORDER — GADOBUTROL 604.72 MG/ML
8 INJECTION INTRAVENOUS
Status: COMPLETED | OUTPATIENT
Start: 2025-07-02 | End: 2025-07-02

## 2025-07-02 RX ADMIN — GADOBUTROL 8 ML: 604.72 INJECTION INTRAVENOUS at 12:07
